# Patient Record
Sex: FEMALE | Race: BLACK OR AFRICAN AMERICAN | Employment: UNEMPLOYED | URBAN - METROPOLITAN AREA
[De-identification: names, ages, dates, MRNs, and addresses within clinical notes are randomized per-mention and may not be internally consistent; named-entity substitution may affect disease eponyms.]

---

## 2017-01-27 ENCOUNTER — OFFICE VISIT (OUTPATIENT)
Dept: CARDIOLOGY CLINIC | Age: 62
End: 2017-01-27

## 2017-01-27 VITALS
HEART RATE: 88 BPM | SYSTOLIC BLOOD PRESSURE: 100 MMHG | OXYGEN SATURATION: 93 % | RESPIRATION RATE: 20 BRPM | BODY MASS INDEX: 35.07 KG/M2 | WEIGHT: 205.4 LBS | HEIGHT: 64 IN | DIASTOLIC BLOOD PRESSURE: 62 MMHG

## 2017-01-27 DIAGNOSIS — I10 ESSENTIAL HYPERTENSION: ICD-10-CM

## 2017-01-27 DIAGNOSIS — E11.22 TYPE 2 DIABETES MELLITUS WITH DIABETIC CHRONIC KIDNEY DISEASE, UNSPECIFIED CKD STAGE, UNSPECIFIED LONG TERM INSULIN USE STATUS: ICD-10-CM

## 2017-01-27 DIAGNOSIS — I42.9 CARDIOMYOPATHY (HCC): Primary | ICD-10-CM

## 2017-01-27 DIAGNOSIS — E66.9 NON MORBID OBESITY, UNSPECIFIED OBESITY TYPE: ICD-10-CM

## 2017-01-27 DIAGNOSIS — Z79.4 ENCOUNTER FOR LONG-TERM (CURRENT) USE OF INSULIN (HCC): ICD-10-CM

## 2017-01-27 RX ORDER — SERTRALINE HYDROCHLORIDE 100 MG/1
200 TABLET, FILM COATED ORAL DAILY
COMMUNITY

## 2017-01-27 RX ORDER — ACETAMINOPHEN AND CODEINE PHOSPHATE 300; 30 MG/1; MG/1
2 TABLET ORAL
COMMUNITY
End: 2017-02-03 | Stop reason: ALTCHOICE

## 2017-01-27 RX ORDER — ASPIRIN 81 MG/1
81 TABLET ORAL DAILY
COMMUNITY

## 2017-01-27 NOTE — PROGRESS NOTES
Visit Vitals    /62 (BP 1 Location: Left arm, BP Patient Position: Sitting)    Pulse 88    Resp 20    Ht 5' 4\" (1.626 m)    Wt 205 lb 6.4 oz (93.2 kg)    SpO2 93%    BMI 35.26 kg/m2

## 2017-01-27 NOTE — PROGRESS NOTES
HISTORY OF PRESENTING ILLNESS      Aiden Casas is a 64 y.o. female with diabetes, hypertension, obesity, anxiety, asthma, CAD, GERD, depression, vertigo and cardiomyopathy referred for consideration of ICD primary prevention of sudden cardiac death. .     . The patient denies chest pain/ shortness of breath, orthopnea, PND, LE edema, palpitations, syncope, presyncope or fatigue.         ACTIVE PROBLEM LIST     Patient Active Problem List    Diagnosis Date Noted    Cardiomyopathy [facility]) 01/27/2017    Non morbid obesity 11/04/2016    Encounter for long-term (current) use of insulin ([facility] 06/14/2016    BMI 35.0-35.9,adult 01/04/2016    Essential hypertension 01/04/2016    Type 2 diabetes mellitus with diabetic chronic kidney disease ([facility] 01/04/2016    Obesity (BMI 30.0-34.9) 08/13/2015    Anxiety state, unspecified 07/02/2014    Invalid Neuropsych Profile With Very Strong Evidence Of Poor Test Taking Effort/Symptom Exaggeration/Malingering 04/24/2014    Memory loss 04/23/2014    Headache(784.0) 04/23/2014           PAST MEDICAL HISTORY     Past Medical History   Diagnosis Date    Anxiety     Asthma     Carpal tunnel syndrome on both sides     Chronic mental illness     Coronary artery disease     Depression     Fracture      right ankle    GERD (gastroesophageal reflux disease)     Hypertension     Memory disorder     PUD (peptic ulcer disease)     Type II or unspecified type diabetes mellitus without mention of complication, uncontrolled     Vertigo            PAST SURGICAL HISTORY     Past Surgical History   Procedure Laterality Date    Hx orthopaedic      Hx free skin graft      Hx coronary stent placement  2014    Hx ankle fracture tx            ALLERGIES     Allergies   Allergen Reactions    Beef Derived (Bovine) Hives    Shellfish Derived Hives          FAMILY HISTORY     Family History   Problem Relation Age of Onset    Cancer Mother     negative for cardiac disease SOCIAL HISTORY     Social History     Social History    Marital status:      Spouse name: N/A    Number of children: N/A    Years of education: N/A     Social History Main Topics    Smoking status: Never Smoker    Smokeless tobacco: Never Used    Alcohol use No    Drug use: No    Sexual activity: Not Currently     Other Topics Concern    None     Social History Narrative         MEDICATIONS     Current Outpatient Prescriptions   Medication Sig    amoxicillin (AMOXIL) 875 mg tablet     aspirin (ASPIRIN) 325 mg tablet     atorvastatin (LIPITOR) 40 mg tablet     butalbital-acetaminophen-caffeine (FIORICET, ESGIC) -40 mg per tablet     carvedilol (COREG) 6.25 mg tablet     cephALEXin (KEFLEX) 500 mg capsule     dicyclomine (BENTYL) 10 mg capsule     gabapentin (NEURONTIN) 100 mg capsule     MUCINEX DM  mg per tablet     TRUEPLUS LANCETS 33 gauge misc     lisinopril (PRINIVIL, ZESTRIL) 5 mg tablet     loratadine (CLARITIN) 10 mg tablet     sertraline (ZOLOFT) 100 mg tablet     Insulin Syringe-Needle U-100 0.5 mL 31 gauge x 5/16 syrg     FERROUS FUMARATE (IRON PO) Take 27 mg by mouth daily.  insulin NPH (NOVOLIN N) 100 unit/mL injection Inject 55 units in AM and 45 units at bedtime. STOP NOVOLIN 70/30    insulin regular (NOVOLIN R) 100 unit/mL injection Use with sliding scale Max units daily: 75    furosemide (LASIX) 20 mg tablet TAKE ONE TABLET BY MOUTH ONCE DAILY    traMADol (ULTRAM) 50 mg tablet Take 50 mg by mouth two (2) times a day.  butalbital-acetaminophen (PHRENILIN)  mg tablet Take 1 Tab by mouth every six (6) hours as needed.  PRENATAL VIT W-CA,FE,FA,<1 MG, (PRENATAL VITAMIN PO) Take 2 Tabs by mouth daily.  benzonatate (TESSALON) 200 mg capsule Take 200 mg by mouth three (3) times daily as needed for Cough.  sitaGLIPtin (JANUVIA) 50 mg tablet Take 1 Tab by mouth every morning.     guaiFENesin SR (MUCINEX) 600 mg SR tablet Take 1 Tab by mouth two (2) times a day.  fluocinoNIDE (LIDEX) 0.05 % topical cream Apply  to affected area two (2) times a day.  isosorbide mononitrate ER (IMDUR) 30 mg tablet Take  by mouth daily.  albuterol (PROAIR HFA) 90 mcg/actuation inhaler Take  by inhalation.  amLODIPine (NORVASC) 5 mg tablet Take 5 mg by mouth daily.  glucose blood VI test strips (TRUETEST TEST STRIPS) strip Test blood glucose 4 times daily    Lancets misc Test blood glucose 4 times daily    meclizine (ANTIVERT) 25 mg tablet Take 1 tablet by mouth three (3) times daily as needed.  metoprolol (LOPRESSOR) 25 mg tablet Take  by mouth two (2) times a day.  buPROPion XL (WELLBUTRIN XL) 300 mg XL tablet Take 300 mg by mouth every morning.  omeprazole (PRILOSEC) 40 mg capsule Take 40 mg by mouth daily.  pravastatin (PRAVACHOL) 40 mg tablet Take 40 mg by mouth nightly.  LORazepam (ATIVAN) 0.5 mg tablet Take  by mouth.  famotidine (PEPCID) 20 mg tablet Take 20 mg by mouth two (2) times a day.  metoclopramide HCl (REGLAN) 5 mg tablet Take 5 mg by mouth Before breakfast, lunch, and dinner.  losartan (COZAAR) 25 mg tablet Take  by mouth daily.  zolpidem (AMBIEN) 10 mg tablet Take  by mouth nightly as needed for Sleep.  calcium-cholecalciferol, D3, (CALCARB 600 WITH VITAMIN D) tablet Take 2 Tabs by mouth daily.  nitroglycerin (NITROSTAT) 0.4 mg SL tablet by SubLINGual route every five (5) minutes as needed for Chest Pain.  loratadine 10 mg cap Take  by mouth.  aspirin 81 mg chewable tablet Take 81 mg by mouth daily.  multivitamin (ONE A DAY) tablet Take 1 Tab by mouth daily.  topiramate (TOPAMAX) 25 mg tablet 1 po qhs     No current facility-administered medications for this visit. I have reviewed the nurses notes, vitals, problem list, allergy list, medical history, family, social history and medications. REVIEW OF SYMPTOMS      General: Pt denies excessive weight gain or loss.  Pt is able to conduct ADL's  HEENT: Denies blurred vision, headaches, hearing loss, epistaxis and difficulty swallowing. Respiratory: Denies cough, congestion, shortness of breath, BECKWITH, wheezing or stridor. Cardiovascular: Denies precordial pain, palpitations, edema or PND  Gastrointestinal: Denies poor appetite, indigestion, abdominal pain or blood in stool  Genitourinary: Denies hematuria, dysuria, increased urinary frequency  Musculoskeletal: Denies joint pain or swelling from muscles or joints  Neurologic: Denies tremor, paresthesias, headache, or sensory motor disturbance  Psychiatric: Denies confusion, insomnia, depression  Integumentray: Denies rash, itching or ulcers. Hematologic: Denies easy bruising, bleeding     PHYSICAL EXAMINATION      Vitals:    01/27/17 1047   Resp: 20   Weight: 205 lb 6.4 oz (93.2 kg)   Height: 5' 4\" (1.626 m)     General: Well developed, in no acute distress. HEENT: No jaundice, oral mucosa moist, no oral ulcers  Neck: Supple, no stiffness, no lymphadenopathy, supple  Heart:  Normal S1/S2 negative S3 or S4. Regular, no murmur, gallop or rub, no jugular venous distention  Respiratory: Clear bilaterally x 4, no wheezing or rales  Abdomen:   Soft, non-tender, bowel sounds are active.   Extremities:  No edema, normal cap refill, no cyanosis. Musculoskeletal: No clubbing, no deformities  Neuro: A&Ox3, speech clear, gait stable, cooperative, no focal neurologic deficits  Skin: Skin color is normal. No rashes or lesions.  Non diaphoretic, moist.  Vascular: 2+ pulses symmetric in all extremities       DIAGNOSTIC DATA      EKG:        LABORATORY DATA    No results found for: WBC, HGBPOC, HGB, HGBP, HCTPOC, HCT, PHCT, RBCH, PLT, MCV, HGBEXT, HCTEXT, PLTEXT   Lab Results   Component Value Date/Time    Sodium 141 03/30/2016 10:07 AM    Potassium 4.3 03/30/2016 10:07 AM    Chloride 104 03/30/2016 10:07 AM    CO2 22 03/30/2016 10:07 AM    Glucose 269 03/30/2016 10:07 AM    BUN 25 03/30/2016 10:07 AM    Creatinine 1.18 03/30/2016 10:07 AM    BUN/Creatinine ratio 21 03/30/2016 10:07 AM    GFR est AA 58 03/30/2016 10:07 AM    GFR est non-AA 50 03/30/2016 10:07 AM    Calcium 10.2 03/30/2016 10:07 AM    Bilirubin, total <0.2 03/30/2016 10:07 AM    ALT 11 03/30/2016 10:07 AM    AST 10 03/30/2016 10:07 AM    Alk. phosphatase 100 03/30/2016 10:07 AM    Protein, total 7.3 03/30/2016 10:07 AM    Albumin 4.2 03/30/2016 10:07 AM    A-G Ratio 1.4 03/30/2016 10:07 AM           ASSESSMENT      1. Cardiomyopathy  2. CAD  3. Hypertension  4. Diabetes Mellitus  5. GERD       PLAN          FOLLOW-UP       Thank you,  Katia Saleem MD and Dr. Hudson Christianson for involving me in the care of this extraordinarily pleasant female. Please do not hesitate to contact me for further questions/concerns.          Kevin Law MD  Cardiac Electrophysiology / Cardiology    [facility].  [address] [facility], [facility], [address], [facility].    Ephraim Webb  [phone] / [phone] Fax   [phone] / [phone] Fax

## 2017-01-27 NOTE — PATIENT INSTRUCTIONS
Treatment Plan:  Single Chamber ICD implant at [facility]  February 10th. You are scheduled for an ICD (implantable cardiac defibrillator) implant at [facility].    Please arrive at the patient registration desk located on the 1st floor of the main building at 11:30am.    Do not eat or drink anything after midnight the night before your procedure. You will need a . You may take your normal medications with a sip of water the morning of your procedure. Lab instructions: Please have labs drawn 7-10 days prior to scheduled procedure. Please call Ange Colbert or Avelina Munoz if you have any questions at [phone]. Please call the office if you develop any type of illness prior to your procedure. Please contact our business office at [phone] with any financial concerns. Implantable Cardioverter-Defibrillator Placement: Before Your Procedure  What is an implantable cardioverter-defibrillator? An implantable cardioverter-defibrillator (ICD) is a small, battery-powered device. It fixes life-threatening changes in your heartbeat. If the ICD detects a life-threatening heart rhythm, it tries to get it back to normal. If the dangerous rhythm does not stop, the ICD sends an electric shock to the heart to restore a normal rhythm. The device then goes back to its watchful mode. The doctor puts an ICD in your chest and attaches it to thin wires, called leads. The leads carry the shocks from the ICD to the heart. Before the procedure, you will get medicine to help you relax. The doctor will make an incision (cut) in the skin just below your collarbone. The cut may be on either side of your chest. The doctor will put the ICD leads through the cut. The leads go into a large blood vessel in the upper chest. Then the doctor will guide the leads through the blood vessel into the heart.  The doctor will place the ICD under the skin of your chest. He or she will attach the leads to the ICD. Then the cut will be closed with stitches. The procedure usually takes about an hour. You may stay in the hospital for 1 or 2 days. You can likely return to many of your normal activities after you get an ICD. But to stay safe, you may need to make some changes to your normal routine. You will need to be careful with certain types of electronic equipment. And you'll need to take extra care with medical and dental tests and procedures. You will be given specific instructions after getting your ICD. You may feel anxious or worried about having an ICD. This is common. You might feel better if you use techniques to help you relax. Make a plan for what to do if the ICD shocks you. And think about how the ICD will help you. Talk to your doctor about ways to help ease anxiety. Follow-up care is a key part of your treatment and safety. Be sure to make and go to all appointments, and call your doctor if you are having problems. It's also a good idea to know your test results and keep a list of the medicines you take. What happens before the procedure? Procedures can be stressful. This information will help you understand what you can expect. And it will help you safely prepare for your procedure. Preparing for the procedure  · Understand exactly what procedure is planned, along with the risks, benefits, and other options. · Tell your doctors ALL the medicines, vitamins, supplements, and herbal remedies you take. Some of these can increase the risk of bleeding or interact with anesthesia. · If you take blood thinners, such as warfarin (Coumadin), clopidogrel (Plavix), or aspirin, be sure to talk to your doctor. He or she will tell you if you should stop taking these medicines before your procedure. Make sure that you understand exactly what your doctor wants you to do. · Your doctor will tell you which medicines to take or stop before your procedure.  You may need to stop taking certain medicines a week or more before the procedure. So talk to your doctor as soon as you can. · If you have an advance directive, let your doctor know. It may include a living will and a durable power of  for health care. Bring a copy to the hospital. If you don't have one, you may want to prepare one. It lets your doctor and loved ones know your health care wishes. Doctors advise that everyone prepare these papers before any type of surgery or procedure. What happens on the day of the procedure? · Follow the instructions exactly about when to stop eating and drinking. If you don't, your procedure may be canceled. If your doctor told you to take your medicines on the day of the procedure, take them with only a sip of water. · Take a bath or shower before you come in for your procedure. Do not apply lotions, perfumes, deodorants, or nail polish. · Take off all jewelry and piercings. And take out contact lenses, if you wear them. At the hospital or surgery center  · Bring a picture ID. · You will be kept comfortable and safe by your anesthesia provider. You may get medicine that relaxes you or puts you in a light sleep. The area being worked on will be numb. · The procedure will take at least 1 hour. Going home  · Be sure you have someone to drive you home. Anesthesia and pain medicine make it unsafe for you to drive. · You will be given more specific instructions about recovering from your procedure. They will cover things like diet, wound care, follow-up care, driving, and getting back to your normal routine. When should you call your doctor? · You have questions or concerns. · You don't understand how to prepare for your procedure. · You become ill before the procedure (such as fever, flu, or a cold). · You need to reschedule or have changed your mind about having the procedure. Where can you learn more? Go to http://jazz-wesly.info/.   Enter C474 in the search box to learn more about \"Implantable Cardioverter-Defibrillator Placement: Before Your Procedure. \"  Current as of: January 27, 2016  Content Version: 11.1  © 7282-8374 [employer]. Care instructions adapted under license by [employer] (which disclaims liability or warranty for this information). If you have questions about a medical condition or this instruction, always ask your healthcare professional. [place] [address] any warranty or liability for your use of this information.

## 2017-02-03 ENCOUNTER — OFFICE VISIT (OUTPATIENT)
Dept: ENDOCRINOLOGY | Age: 62
End: 2017-02-03

## 2017-02-03 VITALS
HEART RATE: 82 BPM | SYSTOLIC BLOOD PRESSURE: 128 MMHG | TEMPERATURE: 97.6 F | DIASTOLIC BLOOD PRESSURE: 69 MMHG | HEIGHT: 64 IN | RESPIRATION RATE: 18 BRPM | WEIGHT: 206.7 LBS | BODY MASS INDEX: 35.29 KG/M2

## 2017-02-03 DIAGNOSIS — E11.65 TYPE 2 DIABETES MELLITUS WITH HYPERGLYCEMIA, WITH LONG-TERM CURRENT USE OF INSULIN (HCC): Primary | ICD-10-CM

## 2017-02-03 DIAGNOSIS — Z79.4 TYPE 2 DIABETES MELLITUS WITH HYPERGLYCEMIA, WITH LONG-TERM CURRENT USE OF INSULIN (HCC): Primary | ICD-10-CM

## 2017-02-03 DIAGNOSIS — E78.2 MIXED HYPERLIPIDEMIA: ICD-10-CM

## 2017-02-03 DIAGNOSIS — I10 ESSENTIAL HYPERTENSION: ICD-10-CM

## 2017-02-03 RX ORDER — INSULIN GLARGINE 100 [IU]/ML
INJECTION, SOLUTION SUBCUTANEOUS
Qty: 40 ML | Refills: 5 | Status: SHIPPED | OUTPATIENT
Start: 2017-02-03 | End: 2017-02-06 | Stop reason: ALTCHOICE

## 2017-02-03 NOTE — PROGRESS NOTES
[facility]               Susan Mchugh MD        [address] 78 [phone] Fax [phone]  [id number]        [address] [address] OU:[phone] Fax [phone] ( Monday)          Patient Information  Date:2/4/2017  Name : Michelle Hernandez 64 y.o.     YOB: 1955         Referred by: self referred        Chief Complaint   Patient presents with    Diabetes     3 mo f/u       History of Present Illness: Michelle Hernandez is a 64 y.o. female here for follow-up of  Type 2 Diabetes Mellitus. She has a longstanding history of type 2 diabetes mellitus. She was on Humulin 70/30 25 units before breakfast and 25 units at bedtime which was changed to NPH and regular insulin  Analogs were expensive  Metformin was discontinued due to CKD   Fasting 70 - 120     She is on NPH     Occasionally has hypoglycemia  No bedtime snack       Here with an aid  Diet not healthy - checking BG     Checking glucose at home    Compliant with medications    No acute issues        SMBG 3 - 4 /day    She has  history of several myocardial infarctions, congestive heart failure. Her cardiologist is Dr. Michi Villa. She has some memory issues, evaluated by psychologist as well as neurology.           Wt Readings from Last 3 Encounters:   02/03/17 206 lb 11.2 oz (93.8 kg)   01/27/17 205 lb 6.4 oz (93.2 kg)   11/03/16 215 lb 11.2 oz (97.8 kg)       BP Readings from Last 3 Encounters:   02/03/17 128/69   01/27/17 100/62   11/03/16 106/58           Past Medical History   Diagnosis Date    Anxiety     Asthma     Carpal tunnel syndrome on both sides     Chronic mental illness     Coronary artery disease     Depression     Fracture      right ankle    GERD (gastroesophageal reflux disease)     Hypertension     Memory disorder     PUD (peptic ulcer disease)     Type II or unspecified type diabetes mellitus without mention of complication, uncontrolled     Vertigo      Current Outpatient Prescriptions   Medication Sig    sertraline (ZOLOFT) 100 mg tablet Take 150 mg by mouth daily.  aspirin delayed-release 81 mg tablet Take  by mouth daily.  carvedilol (COREG) 6.25 mg tablet Take 6.25 mg by mouth two (2) times daily (with meals).  gabapentin (NEURONTIN) 100 mg capsule 100 mg two (2) times a day.  TRUEPLUS LANCETS 33 gauge misc     Insulin Syringe-Needle U-100 0.5 mL 31 gauge x 5/16 syrg     FERROUS FUMARATE (IRON PO) Take 27 mg by mouth daily.  insulin regular (NOVOLIN R) 100 unit/mL injection Use with sliding scale Max units daily: 75    furosemide (LASIX) 20 mg tablet TAKE ONE TABLET BY MOUTH ONCE DAILY    PRENATAL VIT W-CA,FE,FA,<1 MG, (PRENATAL VITAMIN PO) Take 2 Tabs by mouth daily.  sitaGLIPtin (JANUVIA) 50 mg tablet Take 1 Tab by mouth every morning.  guaiFENesin SR (MUCINEX) 600 mg SR tablet Take 1 Tab by mouth two (2) times a day. (Patient taking differently: Take 600 mg by mouth two (2) times daily as needed.)    isosorbide mononitrate ER (IMDUR) 30 mg tablet Take  by mouth daily.  albuterol (PROAIR HFA) 90 mcg/actuation inhaler Take  by inhalation.  amLODIPine (NORVASC) 5 mg tablet Take 5 mg by mouth daily.  glucose blood VI test strips (TRUETEST TEST STRIPS) strip Test blood glucose 4 times daily    Lancets misc Test blood glucose 4 times daily    meclizine (ANTIVERT) 25 mg tablet Take 1 tablet by mouth three (3) times daily as needed.  buPROPion XL (WELLBUTRIN XL) 300 mg XL tablet Take 300 mg by mouth every morning.  omeprazole (PRILOSEC) 40 mg capsule Take 40 mg by mouth daily.  pravastatin (PRAVACHOL) 40 mg tablet Take 40 mg by mouth nightly.  famotidine (PEPCID) 20 mg tablet Take 20 mg by mouth two (2) times a day.  zolpidem (AMBIEN) 10 mg tablet Take  by mouth nightly as needed for Sleep.  calcium-cholecalciferol, D3, (CALCARB 600 WITH VITAMIN D) tablet Take 2 Tabs by mouth daily.     nitroglycerin (NITROSTAT) 0.4 mg SL tablet by SubLINGual route every five (5) minutes as needed for Chest Pain.  loratadine 10 mg cap Take  by mouth.  insulin glargine (LANTUS) 100 unit/mL injection Inject 60  units in AM and 45 units at bedtime Stop NPH    lisinopril (PRINIVIL, ZESTRIL) 5 mg tablet     metoprolol (LOPRESSOR) 25 mg tablet Take  by mouth two (2) times a day.  LORazepam (ATIVAN) 0.5 mg tablet Take  by mouth.  metoclopramide HCl (REGLAN) 5 mg tablet Take 5 mg by mouth Before breakfast, lunch, and dinner.  losartan (COZAAR) 25 mg tablet Take  by mouth daily.  topiramate (TOPAMAX) 25 mg tablet 1 po qhs     No current facility-administered medications for this visit. Allergies   Allergen Reactions    Beef Derived (Bovine) Hives    Shellfish Derived Hives         Review of Systems:  -   - Eyes: no blurry vision no double vision  - Cardiovascular: no chest pain ,no palpitations  - Respiratory: no cough no shortness of breath  - Gastrointestinal: no dysphagia no  abdominal pain  -   -     Physical Examination:   Blood pressure 128/69, pulse 82, temperature 97.6 °F (36.4 °C), temperature source Oral, resp. rate 18, height 5' 4\" (1.626 m), weight 206 lb 11.2 oz (93.8 kg). Estimated body mass index is 35.48 kg/(m^2) as calculated from the following:    Height as of this encounter: 5' 4\" (1.626 m). -   Weight as of this encounter: 206 lb 11.2 oz (93.8 kg). - General: pleasant, no distress, good eye contact  - HEENT: no pallor, no periorbital edema, EOMI  - Neck: supple, no thyromegaly  - Cardiovascular: regular, normal rate, normal S1 and S2,  - Respiratory: clear to auscultation bilaterally  - Gastrointestinal: soft, nontender, nondistended,  BS +  - Musculoskeletal: no edema,no ulcers  - Psychiatric: normal mood and affect  - Skin: color, texture, turgor normal.       Data Reviewed:     [] Glucose records reviewed. [] See glucose records for details (to be scanned).   [] A1C  [] Reviewed labs        Assessment/Plan:     1. Type 2 diabetes mellitus with hyperglycemia, with long-term current use of insulin ([facility])        1. 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 HTN : Continue current therapy     3. Hyperlipidemia : Continue statin. 4. CKD  -    5 . Hypercalcemia, mild, PTH nonsuppressed - resolved      6 . Osteoporosis - managed by PCP   Ankle fracture  GERD,       7 CAD/CHF - Dr Alexandro Garcia, EF 20%    Patient Instructions   Check blood sugars before meals and at bedtime. If the bedtime sugars are less than 100 ,eat a 15 gm snack. Weight and diet control. Januvia 50 mg in AM     The night before surgery donot take Lantus      Lantus or Novolin N cloudy insulin ( slow insulin ) 60  units in AM and 45 units at bedtime    Novolin R clear insulin ( fast acting )  as needed for high sugars before meals   And take 5 units for snacks     Blood sugar  Breakfast/Lunch/Dinner       150-200  Add 5  Units       201-250  Add 10 Units       251-300  Add  15 Units       301-350  Add 20 Units        351-400  Add 25  Units         Follow-up Disposition:  Return in about 3 months (around 5/3/2017). Thank you for allowing me to participate in the care of this patient.     Elisa Rizo MD

## 2017-02-03 NOTE — PROGRESS NOTES
Colette Fleming is a 64 y.o. female here for   Chief Complaint   Patient presents with    Diabetes     3 mo f/u       Functional glucose monitor and record keeping system? - yes  Eye exam within last year? - yes   Foot exam within last year? - yes Jan 2017    Lab Results   Component Value Date/Time    Hemoglobin A1c 8.9 03/30/2016 10:07 AM    Hemoglobin A1c (POC) 7.8 11/03/2016 10:15 AM    Hemoglobin A1c, External 8.3 11/16/2016       Wt Readings from Last 3 Encounters:   01/27/17 205 lb 6.4 oz (93.2 kg)   11/03/16 215 lb 11.2 oz (97.8 kg)   06/14/16 202 lb 14.4 oz (92 kg)     Temp Readings from Last 3 Encounters:   11/03/16 100 °F (37.8 °C) (Oral)   06/14/16 98.4 °F (36.9 °C) (Oral)   01/04/16 96.8 °F (36 °C) (Oral)     BP Readings from Last 3 Encounters:   01/27/17 100/62   11/03/16 106/58   06/14/16 133/69     Pulse Readings from Last 3 Encounters:   01/27/17 88   11/03/16 (!) 103   06/14/16 65

## 2017-02-03 NOTE — MR AVS SNAPSHOT
Visit Information Date & Time Provider Department Dept. Phone Encounter #  
 2/3/2017 10:45 AM Janeth Simmons MD [facility] [phone] [id number] Follow-up Instructions Return in about 3 months (around 5/3/2017). Upcoming Health Maintenance Date Due Hepatitis C Screening 1955 FOOT EXAM Q1 5/22/1965 EYE EXAM RETINAL OR DILATED Q1 5/22/1965 Pneumococcal 19-64 Highest Risk (1 of 3 - PCV13) 5/22/1974 DTaP/Tdap/Td series (1 - Tdap) 5/22/1976 PAP AKA CERVICAL CYTOLOGY 5/22/1976 BREAST CANCER SCRN MAMMOGRAM 5/22/2005 FOBT Q 1 YEAR AGE 50-75 5/22/2005 ZOSTER VACCINE AGE 60> 5/22/2015 INFLUENZA AGE 9 TO ADULT 8/1/2016 HEMOGLOBIN A1C Q6M 5/16/2017 MICROALBUMIN Q1 11/16/2017 LIPID PANEL Q1 11/16/2017 Allergies as of 2/3/2017  Review Complete On: 2/3/2017 By: Janeth Simmons MD  
  
 Severity Noted Reaction Type Reactions Beef Derived (Bovine)  04/23/2014    Hives Shellfish Derived  04/23/2014    Hives Current Immunizations  Never Reviewed No immunizations on file. Not reviewed this visit You Were Diagnosed With   
  
 Codes Comments Type 2 diabetes mellitus with hyperglycemia, with long-term current use of insulin (HCC)    -  Primary ICD-10-CM: E11.65, Z79.4 ICD-9-CM: 250.00, 790.29, V58.67 Vitals BP Pulse Temp Resp Height(growth percentile) Weight(growth percentile) 128/69 (BP 1 Location: Right arm, BP Patient Position: Sitting) 82 97.6 °F (36.4 °C) (Oral) 18 5' 4\" (1.626 m) 206 lb 11.2 oz (93.8 kg) BMI OB Status Smoking Status 35.48 kg/m2 Menopause Never Smoker BMI and BSA Data Body Mass Index Body Surface Area  
 35.48 kg/m 2 2.06 m 2 Preferred Pharmacy Pharmacy Name Phone [facility] [address] [facility], [address] Your Updated Medication List  
  
   
This list is accurate as of: 2/3/17 10:58 AM. Always use your most recent med list.  
  
  
  
  
 AMBIEN 10 mg tablet Generic drug:  zolpidem Take  by mouth nightly as needed for Sleep. amLODIPine 5 mg tablet Commonly known as:  Augustina Kunz Take 5 mg by mouth daily. aspirin delayed-release 81 mg tablet Take  by mouth daily. buPROPion  mg XL tablet Commonly known as:  Vivek Cooley Take 300 mg by mouth every morning. CALCARB 600 WITH VITAMIN D tablet Generic drug:  calcium-cholecalciferol (D3) Take 2 Tabs by mouth daily. carvedilol 6.25 mg tablet Commonly known as:  Cori Nabil Take 6.25 mg by mouth two (2) times daily (with meals). furosemide 20 mg tablet Commonly known as:  LASIX TAKE ONE TABLET BY MOUTH ONCE DAILY  
  
 gabapentin 100 mg capsule Commonly known as:  NEURONTIN  
100 mg two (2) times a day. glucose blood VI test strips strip Commonly known as:  TRUETEST TEST STRIPS Test blood glucose 4 times daily  
  
 guaiFENesin  mg SR tablet Commonly known as:  [place] [address] Take 1 Tab by mouth two (2) times a day. insulin  unit/mL injection Commonly known as:  NovoLIN N Inject 55 units in AM and 45 units at bedtime. STOP NOVOLIN 70/30  
  
 insulin regular 100 unit/mL injection Commonly known as:  NovoLIN R Use with sliding scale Max units daily: 75 Insulin Syringe-Needle U-100 0.5 mL 31 gauge x 5/16 Syrg IRON PO Take 27 mg by mouth daily. isosorbide mononitrate ER 30 mg tablet Commonly known as:  IMDUR Take  by mouth daily. * Lancets Misc Test blood glucose 4 times daily * TRUEPLUS LANCETS 33 gauge Misc Generic drug:  lancets  
  
 lisinopril 5 mg tablet Commonly known as:  PRINIVIL, ZESTRIL  
  
 loratadine 10 mg Cap Take  by mouth. LORazepam 0.5 mg tablet Commonly known as:  ATIVAN Take  by mouth.  
  
 losartan 25 mg tablet Commonly known as:  COZAAR Take  by mouth daily.   
  
 meclizine 25 mg tablet Commonly known as:  ANTIVERT Take 1 tablet by mouth three (3) times daily as needed. metoclopramide HCl 5 mg tablet Commonly known as:  REGLAN Take 5 mg by mouth Before breakfast, lunch, and dinner. metoprolol tartrate 25 mg tablet Commonly known as:  LOPRESSOR Take  by mouth two (2) times a day. NITROSTAT 0.4 mg SL tablet Generic drug:  nitroglycerin  
by SubLINGual route every five (5) minutes as needed for Chest Pain. omeprazole 40 mg capsule Commonly known as:  PRILOSEC Take 40 mg by mouth daily. PEPCID 20 mg tablet Generic drug:  famotidine Take 20 mg by mouth two (2) times a day. PRAVACHOL 40 mg tablet Generic drug:  pravastatin Take 40 mg by mouth nightly. PRENATAL VITAMIN PO Take 2 Tabs by mouth daily. PROAIR HFA 90 mcg/actuation inhaler Generic drug:  albuterol Take  by inhalation. sertraline 100 mg tablet Commonly known as:  ZOLOFT Take 150 mg by mouth daily. SITagliptin 50 mg tablet Commonly known as:  [place] Take 1 Tab by mouth every morning. topiramate 25 mg tablet Commonly known as:  TOPAMAX 1 po qhs  
  
 * Notice: This list has 2 medication(s) that are the same as other medications prescribed for you. Read the directions carefully, and ask your doctor or other care provider to review them with you. Follow-up Instructions Return in about 3 months (around 5/3/2017). Patient Instructions Check blood sugars before meals and at bedtime. If the bedtime sugars are less than 100 ,eat a 15 gm snack. Weight and diet control. Januvia 50 mg in AM  
 
The night before surgery donot take Lantus Lantus or Novolin N cloudy insulin ( slow insulin ) 60  units in AM and 45 units at bedtime Novolin R clear insulin ( fast acting )  as needed for high sugars before meals   And take 5 units for snacks Blood sugar  Breakfast/Lunch/Dinner 150-200  Add 5  Units 201-250  Add 10 Units 251-300  Add  15 Units 301-350  Add 20 Units 351-400  Add 25  Units Introducing [facility]! [facility] introduces Q Chip patient portal. Now you can access parts of your medical record, email your doctor's office, and request medication refills online. 1. In your internet browser, go to [email or web address] 2. Click on the First Time User? Click Here link in the Sign In box. You will see the New Member Sign Up page. 3. Enter your Q Chip Access Code exactly as it appears below. You will not need to use this code after youve completed the sign-up process. If you do not sign up before the expiration date, you must request a new code. · Q Chip Access Code: [email or web address] Expires: 5/4/2017 10:58 AM 
 
4. Enter the last four digits of your Social Security Number (xxxx) and Date of Birth (mm/dd/yyyy) as indicated and click Submit. You will be taken to the next sign-up page. 5. Create a Q Chip ID. This will be your Q Chip login ID and cannot be changed, so think of one that is secure and easy to remember. 6. Create a Q Chip password. You can change your password at any time. 7. Enter your Password Reset Question and Answer. This can be used at a later time if you forget your password. 8. Enter your e-mail address. You will receive e-mail notification when new information is available in [address] 9. Click Sign Up. You can now view and download portions of your medical record. 10. Click the Download Summary menu link to download a portable copy of your medical information. If you have questions, please visit the Frequently Asked Questions section of the Q Chip website. Remember, Q Chip is NOT to be used for urgent needs. For medical emergencies, dial 911. Now available from your iPhone and Android! Please provide this summary of care documentation to your next provider.  
  
  
 Your primary care clinician is listed as Jostin Cazares. If you have any questions after today's visit, please call [phone].

## 2017-02-03 NOTE — PATIENT INSTRUCTIONS
Check blood sugars before meals and at bedtime. If the bedtime sugars are less than 100 ,eat a 15 gm snack. Weight and diet control.      Januvia 50 mg in AM     The night before surgery donot take Lantus      Lantus or Novolin N cloudy insulin ( slow insulin ) 60  units in AM and 45 units at bedtime    Novolin R clear insulin ( fast acting )  as needed for high sugars before meals   And take 5 units for snacks     Blood sugar  Breakfast/Lunch/Dinner       150-200  Add 5  Units       201-250  Add 10 Units       251-300  Add  15 Units       301-350  Add 20 Units        351-400  Add 25  Units

## 2017-02-04 PROBLEM — E78.2 MIXED HYPERLIPIDEMIA: Status: ACTIVE | Noted: 2017-02-04

## 2017-02-06 ENCOUNTER — TELEPHONE (OUTPATIENT)
Dept: CARDIOLOGY CLINIC | Age: 62
End: 2017-02-06

## 2017-02-06 ENCOUNTER — DOCUMENTATION ONLY (OUTPATIENT)
Dept: CARDIOLOGY CLINIC | Age: 62
End: 2017-02-06

## 2017-02-06 DIAGNOSIS — E11.65 TYPE 2 DIABETES MELLITUS WITH HYPERGLYCEMIA, WITH LONG-TERM CURRENT USE OF INSULIN (HCC): Primary | ICD-10-CM

## 2017-02-06 DIAGNOSIS — Z79.4 TYPE 2 DIABETES MELLITUS WITH HYPERGLYCEMIA, WITH LONG-TERM CURRENT USE OF INSULIN (HCC): Primary | ICD-10-CM

## 2017-02-06 NOTE — TELEPHONE ENCOUNTER
Pt returned your call and stated she is going to her primary care Dr Sandra Kern tomorrow, 2/7/17, to get labs drawn. Please call pt at [phone] with any further questions. Thanks.

## 2017-02-06 NOTE — PROGRESS NOTES
Authorization number: [id number] for Single Chamber ICD Implant at [facility] on Feb 10th

## 2017-02-06 NOTE — TELEPHONE ENCOUNTER
Called Ms. Sonia Gardner to inquire if procedural labs had been drawn. Not in [facility] care. Left message on voice mail to return call to office. ICD implant scheduled for 2/10 @ [facility].

## 2017-02-08 DIAGNOSIS — Z79.4 TYPE 2 DIABETES MELLITUS WITH HYPERGLYCEMIA, WITH LONG-TERM CURRENT USE OF INSULIN (HCC): Primary | ICD-10-CM

## 2017-02-08 DIAGNOSIS — E11.65 TYPE 2 DIABETES MELLITUS WITH HYPERGLYCEMIA, WITH LONG-TERM CURRENT USE OF INSULIN (HCC): Primary | ICD-10-CM

## 2017-02-08 RX ORDER — INSULIN GLARGINE 100 [IU]/ML
INJECTION, SOLUTION SUBCUTANEOUS
Qty: 40 ML | Refills: 5 | Status: SHIPPED | OUTPATIENT
Start: 2017-02-08 | End: 2017-03-28 | Stop reason: ALTCHOICE

## 2017-02-10 ENCOUNTER — OP HISTORICAL/CONVERTED ENCOUNTER (OUTPATIENT)
Dept: OTHER | Age: 62
End: 2017-02-10

## 2017-02-10 RX ORDER — CEPHALEXIN 500 MG/1
500 CAPSULE ORAL 3 TIMES DAILY
Qty: 15 CAP | Refills: 0 | Status: SHIPPED | OUTPATIENT
Start: 2017-02-10 | End: 2017-02-15

## 2017-02-10 NOTE — TELEPHONE ENCOUNTER
Requested Prescriptions     Signed Prescriptions Disp Refills    cephALEXin (KEFLEX) 500 mg capsule 15 Cap 0     Sig: Take 1 Cap by mouth three (3) times daily for 5 days. Authorizing Provider: Enrike Mercado     Ordering User: Andrey Cazares     Prescription sent to [facility] on file per verbal order of Dr. Opal Canavan.

## 2017-02-13 DIAGNOSIS — E11.65 TYPE 2 DIABETES MELLITUS WITH HYPERGLYCEMIA, WITH LONG-TERM CURRENT USE OF INSULIN (HCC): ICD-10-CM

## 2017-02-13 DIAGNOSIS — Z79.4 TYPE 2 DIABETES MELLITUS WITH HYPERGLYCEMIA, WITH LONG-TERM CURRENT USE OF INSULIN (HCC): ICD-10-CM

## 2017-02-22 ENCOUNTER — CLINICAL SUPPORT (OUTPATIENT)
Dept: CARDIOLOGY CLINIC | Age: 62
End: 2017-02-22

## 2017-02-22 ENCOUNTER — OFFICE VISIT (OUTPATIENT)
Dept: CARDIOLOGY CLINIC | Age: 62
End: 2017-02-22

## 2017-02-22 VITALS
OXYGEN SATURATION: 98 % | HEIGHT: 64 IN | HEART RATE: 85 BPM | SYSTOLIC BLOOD PRESSURE: 138 MMHG | RESPIRATION RATE: 20 BRPM | DIASTOLIC BLOOD PRESSURE: 70 MMHG

## 2017-02-22 DIAGNOSIS — I42.9 CARDIOMYOPATHY (HCC): Primary | ICD-10-CM

## 2017-02-22 DIAGNOSIS — Z95.810 PRESENCE OF AUTOMATIC CARDIOVERTER/DEFIBRILLATOR (AICD): Primary | ICD-10-CM

## 2017-02-22 NOTE — PROGRESS NOTES
HISTORY OF PRESENTING ILLNESS      Smitha Goodson is a 64 y.o. female with recent ICD placement presenting today for 2 week device check. Incision site is negative for erythema, swelling or drainage. The patient denies fever, chest pain/ shortness of breath, orthopnea, PND, LE edema, palpitations, syncope, presyncope or fatigue.           ACTIVE PROBLEM LIST     Patient Active Problem List    Diagnosis Date Noted    Mixed hyperlipidemia 02/04/2017    Cardiomyopathy ([facility] [address] 01/27/2017    Non morbid obesity 11/04/2016    Encounter for long-term (current) use of insulin ([facility] 06/14/2016    BMI 35.0-35.9,adult 01/04/2016    Essential hypertension 01/04/2016    Type 2 diabetes mellitus with diabetic chronic kidney disease ([facility] 01/04/2016    Obesity (BMI 30.0-34.9) 08/13/2015    Anxiety state, unspecified 07/02/2014    Invalid Neuropsych Profile With Very Strong Evidence Of Poor Test Taking Effort/Symptom Exaggeration/Malingering 04/24/2014    Memory loss 04/23/2014    Headache(784.0) 04/23/2014           PAST MEDICAL HISTORY     Past Medical History:   Diagnosis Date    Anxiety     Asthma     Carpal tunnel syndrome on both sides     Chronic mental illness     Coronary artery disease     Depression     Fracture     right ankle    GERD (gastroesophageal reflux disease)     Hypertension     Memory disorder     PUD (peptic ulcer disease)     Type II or unspecified type diabetes mellitus without mention of complication, uncontrolled     Vertigo            PAST SURGICAL HISTORY     Past Surgical History:   Procedure Laterality Date    HX ANKLE FRACTURE TX      HX CORONARY STENT PLACEMENT  2014    HX FREE SKIN GRAFT      HX ORTHOPAEDIC            ALLERGIES     Allergies   Allergen Reactions    Beef Derived (Bovine) Hives    Shellfish Derived Hives          FAMILY HISTORY     Family History   Problem Relation Age of Onset    Cancer Mother     negative for cardiac disease       SOCIAL HISTORY Social History     Social History    Marital status:      Spouse name: N/A    Number of children: N/A    Years of education: N/A     Social History Main Topics    Smoking status: Never Smoker    Smokeless tobacco: Never Used    Alcohol use No    Drug use: No    Sexual activity: Not Currently     Other Topics Concern    None     Social History Narrative         MEDICATIONS     Current Outpatient Prescriptions   Medication Sig    ZOLPIDEM TARTRATE (ZOLPIDEM PO) Take  by mouth.  insulin glargine (LANTUS) 100 unit/mL injection Inject 60 units in the AM and 45 units at bedtime Stop NPH and Levemir    insulin detemir (LEVEMIR) 100 unit/mL injection Inject 60 units in the AM and 45 units at bedtime Stop NPH and Lantus    sertraline (ZOLOFT) 100 mg tablet Take 150 mg by mouth daily.  aspirin delayed-release 81 mg tablet Take  by mouth daily.  carvedilol (COREG) 6.25 mg tablet Take 6.25 mg by mouth two (2) times daily (with meals).  gabapentin (NEURONTIN) 100 mg capsule 100 mg two (2) times a day.  TRUEPLUS LANCETS 33 gauge misc     Insulin Syringe-Needle U-100 0.5 mL 31 gauge x 5/16 syrg     FERROUS FUMARATE (IRON PO) Take 27 mg by mouth daily.  insulin regular (NOVOLIN R) 100 unit/mL injection Use with sliding scale Max units daily: 75    furosemide (LASIX) 20 mg tablet TAKE ONE TABLET BY MOUTH ONCE DAILY    PRENATAL VIT W-CA,FE,FA,<1 MG, (PRENATAL VITAMIN PO) Take 2 Tabs by mouth daily.  sitaGLIPtin (JANUVIA) 50 mg tablet Take 1 Tab by mouth every morning.  guaiFENesin SR (MUCINEX) 600 mg SR tablet Take 1 Tab by mouth two (2) times a day. (Patient taking differently: Take 600 mg by mouth two (2) times daily as needed.)    isosorbide mononitrate ER (IMDUR) 30 mg tablet Take  by mouth daily.  albuterol (PROAIR HFA) 90 mcg/actuation inhaler Take  by inhalation.  amLODIPine (NORVASC) 5 mg tablet Take 5 mg by mouth daily.     glucose blood VI test strips (TRUETEST TEST STRIPS) strip Test blood glucose 4 times daily    Lancets misc Test blood glucose 4 times daily    meclizine (ANTIVERT) 25 mg tablet Take 1 tablet by mouth three (3) times daily as needed.  metoprolol (LOPRESSOR) 25 mg tablet Take  by mouth two (2) times a day.  buPROPion XL (WELLBUTRIN XL) 300 mg XL tablet Take 300 mg by mouth every morning.  omeprazole (PRILOSEC) 40 mg capsule Take 40 mg by mouth daily.  pravastatin (PRAVACHOL) 40 mg tablet Take 40 mg by mouth nightly.  LORazepam (ATIVAN) 0.5 mg tablet Take  by mouth.  famotidine (PEPCID) 20 mg tablet Take 20 mg by mouth two (2) times a day.  metoclopramide HCl (REGLAN) 5 mg tablet Take 5 mg by mouth Before breakfast, lunch, and dinner.  losartan (COZAAR) 25 mg tablet Take  by mouth daily.  zolpidem (AMBIEN) 10 mg tablet Take  by mouth nightly as needed for Sleep.  calcium-cholecalciferol, D3, (CALCARB 600 WITH VITAMIN D) tablet Take 2 Tabs by mouth daily.  nitroglycerin (NITROSTAT) 0.4 mg SL tablet by SubLINGual route every five (5) minutes as needed for Chest Pain.  loratadine 10 mg cap Take  by mouth.  topiramate (TOPAMAX) 25 mg tablet 1 po qhs    lisinopril (PRINIVIL, ZESTRIL) 5 mg tablet      No current facility-administered medications for this visit. I have reviewed the nurses notes, vitals, problem list, allergy list, medical history, family, social history and medications. REVIEW OF SYMPTOMS      General: Pt denies excessive weight gain or loss. Pt is able to conduct ADL's  HEENT: Denies blurred vision, headaches, hearing loss, epistaxis and difficulty swallowing. Respiratory: Denies cough, congestion, shortness of breath, BECKWITH, wheezing or stridor.   Cardiovascular: Denies precordial pain, palpitations, edema or PND  Gastrointestinal: Denies poor appetite, indigestion, abdominal pain or blood in stool  Genitourinary: Denies hematuria, dysuria, increased urinary frequency  Musculoskeletal: Denies joint pain or swelling from muscles or joints  Neurologic: Denies tremor, paresthesias, headache, or sensory motor disturbance  Psychiatric: Denies confusion, insomnia, depression  Integumentray: right sided subclavian incision, steri strips still present. Denies rash, itching or ulcers. Hematologic: Denies easy bruising, bleeding       PHYSICAL EXAMINATION      Vitals:    02/22/17 1249   BP: 138/70   Pulse: 85   Resp: 20   SpO2: 98%   Height: 5' 4\" (1.626 m)     General: Well developed, in no acute distress. HEENT: No jaundice, oral mucosa moist, no oral ulcers  Neck: Supple, no stiffness, no lymphadenopathy, supple  Heart:  Normal S1/S2 negative S3 or S4. Regular, no murmur, gallop or rub, no jugular venous distention  Respiratory: Clear bilaterally x 4, no wheezing or rales  Abdomen:   Soft, non-tender, bowel sounds are active.   Extremities:  No edema, normal cap refill, no cyanosis. Musculoskeletal: No clubbing, no deformities  Neuro: A&Ox3, speech clear, gait stable, cooperative, no focal neurologic deficits  Skin: Skin color is normal. No rashes or lesions. Non diaphoretic, moist.  Vascular: 2+ pulses symmetric in all extremities       DIAGNOSTIC DATA      EKG: sinus rhythm with PACs       LABORATORY DATA    No results found for: WBC, HGBPOC, HGB, HGBP, HCTPOC, HCT, PHCT, RBCH, PLT, MCV, HGBEXT, HCTEXT, PLTEXT   Lab Results   Component Value Date/Time    Sodium 141 03/30/2016 10:07 AM    Potassium 4.3 03/30/2016 10:07 AM    Chloride 104 03/30/2016 10:07 AM    CO2 22 03/30/2016 10:07 AM    Glucose 269 03/30/2016 10:07 AM    BUN 25 03/30/2016 10:07 AM    Creatinine 1.18 03/30/2016 10:07 AM    BUN/Creatinine ratio 21 03/30/2016 10:07 AM    GFR est AA 58 03/30/2016 10:07 AM    GFR est non-AA 50 03/30/2016 10:07 AM    Calcium 10.2 03/30/2016 10:07 AM    Bilirubin, total <0.2 03/30/2016 10:07 AM    AST (SGOT) 10 03/30/2016 10:07 AM    Alk.  phosphatase 100 03/30/2016 10:07 AM    Protein, total 7.3 03/30/2016 10:07 AM Albumin 4.2 03/30/2016 10:07 AM    A-G Ratio 1.4 03/30/2016 10:07 AM    ALT (SGPT) 11 03/30/2016 10:07 AM           ASSESSMENT      1. Cardiomyopathy   A. Non-ischemic   B. NYHA class 2  2. CAD   A. Native  3. Percutaneous coronary transluminal angioplasty   4. ICD   A. [address] Single chamber   C. Primary prevention         PLAN     Continue mobility restrictions and monitor for s/s of infection. FOLLOW-UP     One month    Thank you, Garland Garcia MD and Dr. Lacy Cotter for allowing me to participate in the care of this extraordinarily pleasant female. Please do not hesitate to contact me for further questions/concerns.      Rebecca Gilmore, ARSALAN Chauhan MD  Cardiac Electrophysiology / Cardiology    [place] [address].  [address], [address] [facility]  [place]  [phone] / [phone] Fax   [phone] / [phone] Fax

## 2017-02-22 NOTE — PROGRESS NOTES
Visit Vitals    /70 (BP 1 Location: Left arm, BP Patient Position: Sitting)    Pulse 85    Resp 20    Ht 5' 4\" (1.626 m)    SpO2 98%

## 2017-02-23 NOTE — PROGRESS NOTES
See scanned ICD report in Chart Review. Checked by rep. Chargeable visit.   Going back to Dr Rena Petit

## 2017-03-13 LAB — HBA1C MFR BLD HPLC: 7.7 %

## 2017-03-22 RX ORDER — FUROSEMIDE 20 MG/1
TABLET ORAL
Qty: 30 TAB | Refills: 0 | Status: SHIPPED | OUTPATIENT
Start: 2017-03-22 | End: 2017-05-03 | Stop reason: SDUPTHER

## 2017-03-23 LAB
HBA1C MFR BLD HPLC: 7.7 %
LDL-C, EXTERNAL: 91
MICROALBUMIN UR TEST STR-MCNC: 9.7 MG/DL

## 2017-03-28 DIAGNOSIS — E11.65 TYPE 2 DIABETES MELLITUS WITH HYPERGLYCEMIA, WITH LONG-TERM CURRENT USE OF INSULIN (HCC): Primary | ICD-10-CM

## 2017-03-28 DIAGNOSIS — Z79.4 TYPE 2 DIABETES MELLITUS WITH HYPERGLYCEMIA, WITH LONG-TERM CURRENT USE OF INSULIN (HCC): Primary | ICD-10-CM

## 2017-03-28 RX ORDER — PEN NEEDLE, DIABETIC 31 GX3/16"
NEEDLE, DISPOSABLE MISCELLANEOUS
Qty: 100 PEN NEEDLE | Refills: 11 | Status: SHIPPED | OUTPATIENT
Start: 2017-03-28 | End: 2019-01-08 | Stop reason: SDUPTHER

## 2017-03-28 RX ORDER — INSULIN GLARGINE 100 [IU]/ML
INJECTION, SOLUTION SUBCUTANEOUS
Qty: 45 ML | Refills: 5 | Status: SHIPPED | OUTPATIENT
Start: 2017-03-28 | End: 2017-08-15 | Stop reason: SDUPTHER

## 2017-03-28 NOTE — TELEPHONE ENCOUNTER
Please call in something else other than the Lantus patient stated insurance will not cover.  Thank you

## 2017-04-06 ENCOUNTER — TELEPHONE (OUTPATIENT)
Dept: CARDIOLOGY CLINIC | Age: 62
End: 2017-04-06

## 2017-04-06 NOTE — TELEPHONE ENCOUNTER
Returned patient call, ID verified using two patient identifiers, patient states she needs a box for remote checks for her Mandi Ibanez 80 (implanted 2/10/17 per Dr. Devon Calix). Advised patient that I would forward her request to LUCILLE Santana in the device clinic. Patient address and phone numbers verified as correct. Patient agreeable to this plan.

## 2017-05-01 ENCOUNTER — TELEPHONE (OUTPATIENT)
Dept: CARDIOLOGY CLINIC | Age: 62
End: 2017-05-01

## 2017-05-01 NOTE — TELEPHONE ENCOUNTER
Spoke to pt regarding her Latitude not working. Very hard to understand pt about what happened when her nurse called tech support. She will be in on Tues from 9-2 so I will call her back then.

## 2017-05-01 NOTE — TELEPHONE ENCOUNTER
The patient can be reached at [phone] regarding transmission issues. The troubleshooting is not working with the implant device.

## 2017-05-02 NOTE — TELEPHONE ENCOUNTER
Spoke to pts nurse again to inform her that it is still showing that the communicator is not working. Informed her to reset Latitude by holding down the button on the back of the unit for about a sec then send another transmission. She will try this either today or tomorrow. I will call her tomorrow before 2 to let her know if it worked.

## 2017-05-02 NOTE — TELEPHONE ENCOUNTER
Talked to pts nurse & she stated that she spoke with tech support this am.  They think it is working now. I am having her send a transmission to pair monitor & device & will call her back in about an hr to see if it worked.

## 2017-05-03 ENCOUNTER — OFFICE VISIT (OUTPATIENT)
Dept: ENDOCRINOLOGY | Age: 62
End: 2017-05-03

## 2017-05-03 VITALS
TEMPERATURE: 97.8 F | SYSTOLIC BLOOD PRESSURE: 134 MMHG | BODY MASS INDEX: 34.93 KG/M2 | WEIGHT: 204.6 LBS | DIASTOLIC BLOOD PRESSURE: 70 MMHG | HEART RATE: 89 BPM | HEIGHT: 64 IN | RESPIRATION RATE: 18 BRPM

## 2017-05-03 DIAGNOSIS — Z79.4 TYPE 2 DIABETES MELLITUS WITH HYPERGLYCEMIA, WITH LONG-TERM CURRENT USE OF INSULIN (HCC): ICD-10-CM

## 2017-05-03 DIAGNOSIS — Z79.4 TYPE 2 DIABETES MELLITUS WITH HYPERGLYCEMIA, WITH LONG-TERM CURRENT USE OF INSULIN (HCC): Primary | ICD-10-CM

## 2017-05-03 DIAGNOSIS — E83.52 HYPERCALCEMIA: ICD-10-CM

## 2017-05-03 DIAGNOSIS — E11.65 TYPE 2 DIABETES MELLITUS WITH HYPERGLYCEMIA, WITH LONG-TERM CURRENT USE OF INSULIN (HCC): ICD-10-CM

## 2017-05-03 DIAGNOSIS — I10 ESSENTIAL HYPERTENSION: ICD-10-CM

## 2017-05-03 DIAGNOSIS — I10 ESSENTIAL HYPERTENSION WITH GOAL BLOOD PRESSURE LESS THAN 140/90: ICD-10-CM

## 2017-05-03 DIAGNOSIS — E78.2 MIXED HYPERLIPIDEMIA: ICD-10-CM

## 2017-05-03 DIAGNOSIS — N17.9 ACUTE RENAL FAILURE, UNSPECIFIED ACUTE RENAL FAILURE TYPE (HCC): ICD-10-CM

## 2017-05-03 DIAGNOSIS — E66.09 NON MORBID OBESITY DUE TO EXCESS CALORIES: ICD-10-CM

## 2017-05-03 DIAGNOSIS — E11.65 TYPE 2 DIABETES MELLITUS WITH HYPERGLYCEMIA, WITH LONG-TERM CURRENT USE OF INSULIN (HCC): Primary | ICD-10-CM

## 2017-05-03 RX ORDER — SPIRONOLACTONE 25 MG/1
TABLET ORAL DAILY
COMMUNITY
End: 2019-05-01

## 2017-05-03 NOTE — PATIENT INSTRUCTIONS
Check blood sugars before meals and at bedtime. If the bedtime sugars are less than 100 ,eat a 15 gm snack. Weight and diet control.      Januvia 50 mg in AM        Lantus or Novolin N cloudy insulin ( slow insulin ) 60  units in AM and 45 units at bedtime    Novolin R clear insulin ( fast acting )  as needed for high sugars before meals   And take 5 units for snacks     Blood sugar  Breakfast/Lunch/Dinner       150-200  Add 5  Units       201-250  Add 10 Units       251-300  Add  15 Units       301-350  Add 20 Units        351-400  Add 25  Units

## 2017-05-03 NOTE — PROGRESS NOTES
Leticia Sanders is a 64 y.o. female here for   Chief Complaint   Patient presents with    Diabetes     Scheduled for cataract surgery June 22nd, left eye    Functional glucose monitor and record keeping system? - yes  Eye exam within last year? - yes   Foot exam within last year? - yes Jan 2017    Lab Results   Component Value Date/Time    Hemoglobin A1c 8.9 03/30/2016 10:07 AM    Hemoglobin A1c (POC) 7.8 11/03/2016 10:15 AM    Hemoglobin A1c, External 8.3 11/16/2016       Wt Readings from Last 3 Encounters:   02/03/17 206 lb 11.2 oz (93.8 kg)   01/27/17 205 lb 6.4 oz (93.2 kg)   11/03/16 215 lb 11.2 oz (97.8 kg)     Temp Readings from Last 3 Encounters:   02/03/17 97.6 °F (36.4 °C) (Oral)   11/03/16 100 °F (37.8 °C) (Oral)   06/14/16 98.4 °F (36.9 °C) (Oral)     BP Readings from Last 3 Encounters:   02/22/17 138/70   02/03/17 128/69   01/27/17 100/62     Pulse Readings from Last 3 Encounters:   02/22/17 85   02/03/17 82   01/27/17 88

## 2017-05-03 NOTE — PROGRESS NOTES
[facility]               Reese Workman MD        [address] 78 [phone] Fax [phone] ( [facility]) [address] [address] [address] PO:[phone] Fax [phone] ( Monday)          Patient Information  Date:5/3/2017  Name : Monie Bills 64 y.o.     YOB: 1955         Referred by: self referred        Chief Complaint   Patient presents with    Diabetes       History of Present Illness: Monie Bills is a 64 y.o. female here for follow-up of  Type 2 Diabetes Mellitus. She has a longstanding history of type 2 diabetes mellitus. She was on Humulin 70/30 25 units before breakfast and 25 units at bedtime which was changed to NPH and regular insulin  Analogs were expensive - now on Basaglar   Metformin was discontinued due to CKD    no hypoglycemia  No bedtime snack       Here with an aid  Checking glucose at home    SMBG 3 - 4 /day    She has  history of several myocardial infarctions, congestive heart failure. Her cardiologist is Dr. Carlie Blancas. She has some memory issues, evaluated by psychologist as well as neurology.           Wt Readings from Last 3 Encounters:   05/03/17 204 lb 9.6 oz (92.8 kg)   02/03/17 206 lb 11.2 oz (93.8 kg)   01/27/17 205 lb 6.4 oz (93.2 kg)       BP Readings from Last 3 Encounters:   05/03/17 134/70   02/22/17 138/70   02/03/17 128/69           Past Medical History:   Diagnosis Date    Anxiety     Asthma     Carpal tunnel syndrome on both sides     Chronic mental illness     Coronary artery disease     Depression     Fracture     right ankle    GERD (gastroesophageal reflux disease)     Hypertension     Memory disorder     PUD (peptic ulcer disease)     Type II or unspecified type diabetes mellitus without mention of complication, uncontrolled     Vertigo      Current Outpatient Prescriptions   Medication Sig    guaiFENesin-dextromethorphan SR (MUCINEX DM) 600-30 mg per tablet Take 1 Tab by mouth two (2) times a day.  spironolactone (ALDACTONE) 25 mg tablet Take  by mouth daily.  furosemide (LASIX) 20 mg tablet TAKE ONE TABLET BY MOUTH ONCE DAILY    insulin glargine (BASAGLAR KWIKPEN) 100 unit/mL (3 mL) pen Inject 60 units in the AM and 45 units at bedtime Stop NPH and Levemir    sertraline (ZOLOFT) 100 mg tablet Take 150 mg by mouth daily.  aspirin delayed-release 81 mg tablet Take  by mouth daily.  carvedilol (COREG) 6.25 mg tablet Take 6.25 mg by mouth two (2) times daily (with meals).  gabapentin (NEURONTIN) 100 mg capsule 100 mg two (2) times a day.  FERROUS FUMARATE (IRON PO) Take 65 mg by mouth daily.  sitaGLIPtin (JANUVIA) 50 mg tablet Take 1 Tab by mouth every morning.  isosorbide mononitrate ER (IMDUR) 30 mg tablet Take  by mouth daily.  albuterol (PROAIR HFA) 90 mcg/actuation inhaler Take  by inhalation.  amLODIPine (NORVASC) 5 mg tablet Take 5 mg by mouth daily.  buPROPion XL (WELLBUTRIN XL) 300 mg XL tablet Take 300 mg by mouth every morning.  pravastatin (PRAVACHOL) 40 mg tablet Take 40 mg by mouth nightly.  famotidine (PEPCID) 20 mg tablet Take 20 mg by mouth two (2) times a day.  zolpidem (AMBIEN) 10 mg tablet Take  by mouth nightly as needed for Sleep.  calcium-cholecalciferol, D3, (CALCARB 600 WITH VITAMIN D) tablet Take 2 Tabs by mouth daily.  nitroglycerin (NITROSTAT) 0.4 mg SL tablet by SubLINGual route every five (5) minutes as needed for Chest Pain.  loratadine 10 mg cap Take  by mouth.  insulin regular (NOVOLIN R) 100 unit/mL injection Use with sliding scale Max units daily: 75    Insulin Needles, Disposable, 32 gauge x 5/32\" ndle Use to inject Basaglar BID Dx Code: E11.65    TRUEPLUS LANCETS 33 gauge misc     PRENATAL VIT W-CA,FE,FA,<1 MG, (PRENATAL VITAMIN PO) Take 2 Tabs by mouth daily.  guaiFENesin SR (MUCINEX) 600 mg SR tablet Take 1 Tab by mouth two (2) times a day.  (Patient taking differently: Take 600 mg by mouth two (2) times daily as needed.)    glucose blood VI test strips (TRUETEST TEST STRIPS) strip Test blood glucose 4 times daily    Lancets misc Test blood glucose 4 times daily    meclizine (ANTIVERT) 25 mg tablet Take 1 tablet by mouth three (3) times daily as needed.  omeprazole (PRILOSEC) 40 mg capsule Take 40 mg by mouth daily.  topiramate (TOPAMAX) 25 mg tablet 1 po qhs     No current facility-administered medications for this visit. Allergies   Allergen Reactions    Beef Derived (Bovine) Hives    Shellfish Derived Hives         Review of Systems:  -   - Eyes: no blurry vision no double vision  - Cardiovascular: no chest pain ,no palpitations  - Respiratory: no cough no shortness of breath  - Gastrointestinal: no dysphagia no  abdominal pain  -   -     Physical Examination:   Blood pressure 134/70, pulse 89, temperature 97.8 °F (36.6 °C), temperature source Oral, resp. rate 18, height 5' 4\" (1.626 m), weight 204 lb 9.6 oz (92.8 kg). Estimated body mass index is 35.12 kg/(m^2) as calculated from the following:    Height as of this encounter: 5' 4\" (1.626 m). -   Weight as of this encounter: 204 lb 9.6 oz (92.8 kg). - General: pleasant, no distress, good eye contact  - HEENT: no pallor, no periorbital edema, EOMI  - Neck: supple, no thyromegaly  - Cardiovascular: regular, normal rate, normal S1 and S2,  - Respiratory: clear to auscultation bilaterally  - Gastrointestinal: soft, nontender, nondistended,  BS +  - Musculoskeletal: no edema,no ulcers  - Psychiatric: normal mood and affect  - Skin: color, texture, turgor normal.       Data Reviewed:     [] Glucose records reviewed. [] See glucose records for details (to be scanned). [] A1C  [] Reviewed labs        Assessment/Plan:     1. Type 2 diabetes mellitus with hyperglycemia, with long-term current use of insulin ([address]    2. Essential hypertension        1.  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 HTN : Continue current therapy     3. Hyperlipidemia : Continue statin. 4. CKD  -    5 . Obesity Body mass index is 35.12 kg/(m^2). 6 .Osteoporosis - managed by PCP   Ankle fracture  GERD,       7 CAD/CHF - Dr Abel Grade, EF 20%    Patient Instructions   Check blood sugars before meals and at bedtime. If the bedtime sugars are less than 100 ,eat a 15 gm snack. Weight and diet control. Januvia 50 mg in AM        Lantus or Novolin N cloudy insulin ( slow insulin ) 60  units in AM and 45 units at bedtime    Novolin R clear insulin ( fast acting )  as needed for high sugars before meals   And take 5 units for snacks     Blood sugar  Breakfast/Lunch/Dinner       150-200  Add 5  Units       201-250  Add 10 Units       251-300  Add  15 Units       301-350  Add 20 Units        351-400  Add 25  Units         Follow-up Disposition:  Return in about 3 months (around 8/3/2017). Thank you for allowing me to participate in the care of this patient.     Annamaria Morse MD

## 2017-05-03 NOTE — MR AVS SNAPSHOT
Visit Information Date & Time Provider Department Dept. Phone Encounter #  
 5/3/2017  9:45 AM Lg Allred MD [facility] Diabetes [facility] Endocr[facility]ology [phone] [id number] Follow-up Instructions Return in about 3 months (around 8/3/2017). Upcoming Health Maintenance Date Due Hepatitis C Screening 1955 FOOT EXAM Q1 5/22/1965 EYE EXAM RETINAL OR DILATED Q1 5/22/1965 Pneumococcal 19-64 Highest Risk (1 of 3 - PCV13) 5/22/1974 DTaP/Tdap/Td series (1 - Tdap) 5/22/1976 PAP AKA CERVICAL CYTOLOGY 5/22/1976 BREAST CANCER SCRN MAMMOGRAM 5/22/2005 FOBT Q 1 YEAR AGE 50-75 5/22/2005 ZOSTER VACCINE AGE 60> 5/22/2015 HEMOGLOBIN A1C Q6M 5/16/2017 INFLUENZA AGE 9 TO ADULT 8/1/2017 MICROALBUMIN Q1 11/16/2017 LIPID PANEL Q1 11/16/2017 Allergies as of 5/3/2017  Review Complete On: 5/3/2017 By: Lg Allred MD  
  
 Severity Noted Reaction Type Reactions Beef Derived (Bovine)  04/23/2014    Hives Shellfish Derived  04/23/2014    Hives Current Immunizations  Never Reviewed No immunizations on file. Not reviewed this visit You Were Diagnosed With   
  
 Codes Comments Type 2 diabetes mellitus with hyperglycemia, with long-term current use of insulin (HCC)    -  Primary ICD-10-CM: E11.65, Z79.4 ICD-9-CM: 250.00, 790.29, V58.67 Essential hypertension     ICD-10-CM: I10 
ICD-9-CM: 401.9 Vitals BP Pulse Temp Resp Height(growth percentile) Weight(growth percentile) 134/70 (BP 1 Location: Right arm, BP Patient Position: Sitting) 89 97.8 °F (36.6 °C) (Oral) 18 5' 4\" (1.626 m) 204 lb 9.6 oz (92.8 kg) BMI OB Status Smoking Status 35.12 kg/m2 Menopause Never Smoker BMI and BSA Data Body Mass Index Body Surface Area  
 35.12 kg/m 2 2.05 m 2 Preferred Pharmacy Pharmacy Name Phone [facility] [address] [facility], [address] Your Updated Medication List  
  
   
This list is accurate as of: 5/3/17  1:34 PM.  Always use your most recent med list.  
  
  
  
  
 AMBIEN 10 mg tablet Generic drug:  zolpidem Take  by mouth nightly as needed for Sleep. amLODIPine 5 mg tablet Commonly known as:  Christine Sesayop Take 5 mg by mouth daily. aspirin delayed-release 81 mg tablet Take  by mouth daily. buPROPion  mg XL tablet Commonly known as:  Kacy Wilkinser Take 300 mg by mouth every morning. CALCARB 600 WITH VITAMIN D tablet Generic drug:  calcium-cholecalciferol (D3) Take 2 Tabs by mouth daily. carvedilol 6.25 mg tablet Commonly known as:  Cherrie Devamericahire Take 6.25 mg by mouth two (2) times daily (with meals). furosemide 20 mg tablet Commonly known as:  LASIX TAKE ONE TABLET BY MOUTH ONCE DAILY  
  
 gabapentin 100 mg capsule Commonly known as:  NEURONTIN  
100 mg two (2) times a day. glucose blood VI test strips strip Commonly known as:  TRUETEST TEST STRIPS Test blood glucose 4 times daily  
  
 guaiFENesin  mg SR tablet Commonly known as:  [address] Take 1 Tab by mouth two (2) times a day. insulin glargine 100 unit/mL (3 mL) pen Commonly known asJed Fanning Inject 60 units in the AM and 45 units at bedtime Stop NPH and Levemir Insulin Needles (Disposable) 32 gauge x 5/32\" Ndle Use to inject Basaglar BID Dx Code: E11.65  
  
 insulin regular 100 unit/mL injection Commonly known as:  NovoLIN R Use with sliding scale Max units daily: 75 IRON PO Take 65 mg by mouth daily. isosorbide mononitrate ER 30 mg tablet Commonly known as:  IMDUR Take  by mouth daily. * Lancets Misc Test blood glucose 4 times daily * TRUEPLUS LANCETS 33 gauge Misc Generic drug:  lancets  
  
 loratadine 10 mg Cap Take  by mouth.  
  
 meclizine 25 mg tablet Commonly known as:  ANTIVERT Take 1 tablet by mouth three (3) times daily as needed.   
  
 [address] DM 600-30 mg per tablet Generic drug:  guaiFENesin-dextromethorphan SR Take 1 Tab by mouth two (2) times a day. NITROSTAT 0.4 mg SL tablet Generic drug:  nitroglycerin  
by SubLINGual route every five (5) minutes as needed for Chest Pain. omeprazole 40 mg capsule Commonly known as:  PRILOSEC Take 40 mg by mouth daily. PEPCID 20 mg tablet Generic drug:  famotidine Take 20 mg by mouth two (2) times a day. PRAVACHOL 40 mg tablet Generic drug:  pravastatin Take 40 mg by mouth nightly. PRENATAL VITAMIN PO Take 2 Tabs by mouth daily. PROAIR HFA 90 mcg/actuation inhaler Generic drug:  albuterol Take  by inhalation. sertraline 100 mg tablet Commonly known as:  ZOLOFT Take 150 mg by mouth daily. SITagliptin 50 mg tablet Commonly known as:  Mansoor Soulier Take 1 Tab by mouth every morning. spironolactone 25 mg tablet Commonly known as:  ALDACTONE Take  by mouth daily. topiramate 25 mg tablet Commonly known as:  TOPAMAX 1 po qhs  
  
 * Notice: This list has 2 medication(s) that are the same as other medications prescribed for you. Read the directions carefully, and ask your doctor or other care provider to review them with you. Follow-up Instructions Return in about 3 months (around 8/3/2017). Patient Instructions Check blood sugars before meals and at bedtime. If the bedtime sugars are less than 100 ,eat a 15 gm snack. Weight and diet control. Januvia 50 mg in AM  
 
  
Lantus or Novolin N cloudy insulin ( slow insulin ) 60  units in AM and 45 units at bedtime Novolin R clear insulin ( fast acting )  as needed for high sugars before meals   And take 5 units for snacks Blood sugar  Breakfast/Lunch/Dinner 150-200  Add 5  Units 201-250  Add 10 Units 251-300  Add  15 Units 301-350  Add 20 Units 351-400  Add 25  Units Introducing [facility]!    
 Theresa Bruce introduces [employer] patient portal. Now you can access parts of your medical record, email your doctor's office, and request medication refills online. 1. In your internet browser, go to [email or web address] 2. Click on the First Time User? Click Here link in the Sign In box. You will see the New Member Sign Up page. 3. Enter your [employer] Access Code exactly as it appears below. You will not need to use this code after youve completed the sign-up process. If you do not sign up before the expiration date, you must request a new code. · [employer] Access Code: [email or web address] Expires: 5/4/2017 11:58 AM 
 
4. Enter the last four digits of your Social Security Number (xxxx) and Date of Birth (mm/dd/yyyy) as indicated and click Submit. You will be taken to the next sign-up page. 5. Create a [employer] ID. This will be your [employer] login ID and cannot be changed, so think of one that is secure and easy to remember. 6. Create a [employer] password. You can change your password at any time. 7. Enter your Password Reset Question and Answer. This can be used at a later time if you forget your password. 8. Enter your e-mail address. You will receive e-mail notification when new information is available in [address] 9. Click Sign Up. You can now view and download portions of your medical record. 10. Click the Download Summary menu link to download a portable copy of your medical information. If you have questions, please visit the Frequently Asked Questions section of the [employer] website. Remember, [employer] is NOT to be used for urgent needs. For medical emergencies, dial 911. Now available from your iPhone and Android! Please provide this summary of care documentation to your next provider. Your primary care clinician is listed as Ricardo Sandoval. If you have any questions after today's visit, please call [phone].

## 2017-05-04 NOTE — TELEPHONE ENCOUNTER
Tried calling pt about her Latitude still not working but goes to message saying vm not setup then hangs up. Will try again later.

## 2017-07-11 ENCOUNTER — OFFICE VISIT (OUTPATIENT)
Dept: CARDIOLOGY CLINIC | Age: 62
End: 2017-07-11

## 2017-07-11 DIAGNOSIS — Z95.810 PRESENCE OF AUTOMATIC CARDIOVERTER/DEFIBRILLATOR (AICD): Primary | ICD-10-CM

## 2017-08-09 LAB
CREATININE, EXTERNAL: 1.4
HBA1C MFR BLD HPLC: 9.8 %
LDL-C, EXTERNAL: 95

## 2017-08-10 LAB — CREATININE, EXTERNAL: 1.3

## 2017-08-15 ENCOUNTER — OFFICE VISIT (OUTPATIENT)
Dept: ENDOCRINOLOGY | Age: 62
End: 2017-08-15

## 2017-08-15 VITALS
DIASTOLIC BLOOD PRESSURE: 74 MMHG | OXYGEN SATURATION: 96 % | TEMPERATURE: 97.9 F | BODY MASS INDEX: 34.38 KG/M2 | HEIGHT: 64 IN | HEART RATE: 92 BPM | WEIGHT: 201.4 LBS | SYSTOLIC BLOOD PRESSURE: 128 MMHG | RESPIRATION RATE: 18 BRPM

## 2017-08-15 DIAGNOSIS — E11.22 TYPE 2 DIABETES MELLITUS WITH DIABETIC CHRONIC KIDNEY DISEASE, UNSPECIFIED CKD STAGE, UNSPECIFIED LONG TERM INSULIN USE STATUS: ICD-10-CM

## 2017-08-15 DIAGNOSIS — E83.52 HYPERCALCEMIA: ICD-10-CM

## 2017-08-15 DIAGNOSIS — E66.9 OBESITY (BMI 30.0-34.9): ICD-10-CM

## 2017-08-15 DIAGNOSIS — Z79.4 TYPE 2 DIABETES MELLITUS WITH HYPERGLYCEMIA, WITH LONG-TERM CURRENT USE OF INSULIN (HCC): ICD-10-CM

## 2017-08-15 DIAGNOSIS — E11.65 TYPE 2 DIABETES MELLITUS WITH HYPERGLYCEMIA, WITH LONG-TERM CURRENT USE OF INSULIN (HCC): Primary | ICD-10-CM

## 2017-08-15 DIAGNOSIS — I10 ESSENTIAL HYPERTENSION: ICD-10-CM

## 2017-08-15 DIAGNOSIS — E11.65 TYPE 2 DIABETES MELLITUS WITH HYPERGLYCEMIA, WITH LONG-TERM CURRENT USE OF INSULIN (HCC): ICD-10-CM

## 2017-08-15 DIAGNOSIS — Z79.4 TYPE 2 DIABETES MELLITUS WITH HYPERGLYCEMIA, WITH LONG-TERM CURRENT USE OF INSULIN (HCC): Primary | ICD-10-CM

## 2017-08-15 DIAGNOSIS — E78.2 MIXED HYPERLIPIDEMIA: ICD-10-CM

## 2017-08-15 DIAGNOSIS — E66.09 NON MORBID OBESITY DUE TO EXCESS CALORIES: ICD-10-CM

## 2017-08-15 DIAGNOSIS — I10 ESSENTIAL HYPERTENSION WITH GOAL BLOOD PRESSURE LESS THAN 140/90: ICD-10-CM

## 2017-08-15 DIAGNOSIS — N17.9 ACUTE RENAL FAILURE, UNSPECIFIED ACUTE RENAL FAILURE TYPE (HCC): ICD-10-CM

## 2017-08-15 RX ORDER — INSULIN GLARGINE 100 [IU]/ML
INJECTION, SOLUTION SUBCUTANEOUS
Qty: 45 ML | Refills: 5 | Status: SHIPPED | OUTPATIENT
Start: 2017-08-15 | End: 2017-09-21 | Stop reason: ALTCHOICE

## 2017-08-15 RX ORDER — ATORVASTATIN CALCIUM 40 MG/1
TABLET, FILM COATED ORAL DAILY
COMMUNITY

## 2017-08-15 RX ORDER — LISINOPRIL 5 MG/1
5 TABLET ORAL DAILY
COMMUNITY
Start: 2017-08-10 | End: 2019-05-01

## 2017-08-15 RX ORDER — BUTALBITAL, ACETAMINOPHEN AND CAFFEINE 50; 325; 40 MG/1; MG/1; MG/1
TABLET ORAL
COMMUNITY
Start: 2017-07-25 | End: 2018-08-06

## 2017-08-15 RX ORDER — DENOSUMAB 60 MG/ML
INJECTION SUBCUTANEOUS
COMMUNITY
Start: 2017-07-25

## 2017-08-15 NOTE — PATIENT INSTRUCTIONS
Check blood sugars before meals and at bedtime. If the bedtime sugars are less than 100 ,eat a 15 gm snack. Weight and diet control.      Januvia 50 mg in AM        Basaglar or Novolin N cloudy insulin ( slow acting  insulin ) 60  units in AM and 35 units at bedtime    Novolin R clear insulin ( fast acting )  as needed for high sugars before meals   And take 5 units for snacks     Blood sugar  Breakfast/Lunch/Dinner       150-200  Add 5  Units       201-250  Add 10 Units       251-300  Add  15 Units       301-350  Add 20 Units        351-400  Add 25  Units

## 2017-08-15 NOTE — PROGRESS NOTES
[facility]               Reubenkellie Foley MD        [address] [phone] Fax [phone]  [facility]) [address] [address] MY:[phone] Fax [phone] ( Monday)          Patient Information  Date:8/15/2017  Name : Tara Cardozo 58 y.o.     YOB: 1955         Referred by: self referred        Chief Complaint   Patient presents with    Diabetes    Cholesterol Problem    Hypertension       History of Present Illness: Tara Cardozo is a 58 y.o. female here for follow-up of  Type 2 Diabetes Mellitus. She has a longstanding history of type 2 diabetes mellitus. She was on Humulin 70/30 25 units before breakfast and 25 units at bedtime which was changed to NPH and regular insulin  Analogs were expensive - now on Basaglar   Metformin was discontinued due to CKD    no hypoglycemia  Here with an aid who reports unhealthy and high carb diet - on soft drinks which she has decreased only since ED visit for CHF  Checking glucose at home    SMBG 3 - 4 /day    She has  history of several myocardial infarctions, congestive heart failure. Her cardiologist is Dr. Su Bose. She has some memory issues, evaluated by psychologist as well as neurology.           Wt Readings from Last 3 Encounters:   08/15/17 201 lb 6.4 oz (91.4 kg)   05/03/17 204 lb 9.6 oz (92.8 kg)   02/03/17 206 lb 11.2 oz (93.8 kg)       BP Readings from Last 3 Encounters:   08/15/17 128/74   05/03/17 134/70   02/22/17 138/70           Past Medical History:   Diagnosis Date    Anxiety     Asthma     Carpal tunnel syndrome on both sides     Chronic mental illness     Coronary artery disease     Depression     Fracture     right ankle    GERD (gastroesophageal reflux disease)     Hypertension     Memory disorder     PUD (peptic ulcer disease)     Type II or unspecified type diabetes mellitus without mention of complication, uncontrolled     Vertigo Current Outpatient Prescriptions   Medication Sig    JANUVIA 50 mg tablet TAKE ONE TABLET BY MOUTH IN THE MORNING    spironolactone (ALDACTONE) 25 mg tablet Take  by mouth daily.  insulin regular (NOVOLIN R) 100 unit/mL injection Use with sliding scale Max units daily: 75    furosemide (LASIX) 20 mg tablet TAKE ONE TABLET BY MOUTH ONCE DAILY    insulin glargine (BASAGLAR KWIKPEN) 100 unit/mL (3 mL) pen Inject 60 units in the AM and 45 units at bedtime Stop NPH and Levemir    Insulin Needles, Disposable, 32 gauge x 5/32\" ndle Use to inject Basaglar BID Dx Code: E11.65    sertraline (ZOLOFT) 100 mg tablet Take 150 mg by mouth daily.  aspirin delayed-release 81 mg tablet Take  by mouth daily.  gabapentin (NEURONTIN) 100 mg capsule 100 mg two (2) times a day.  TRUEPLUS LANCETS 33 gauge misc     guaiFENesin SR (MUCINEX) 600 mg SR tablet Take 1 Tab by mouth two (2) times a day. (Patient taking differently: Take 600 mg by mouth two (2) times daily as needed.)    isosorbide mononitrate ER (IMDUR) 30 mg tablet Take 30 mg by mouth daily.  albuterol (PROAIR HFA) 90 mcg/actuation inhaler Take  by inhalation.  amLODIPine (NORVASC) 5 mg tablet Take 5 mg by mouth daily.  glucose blood VI test strips (TRUETEST TEST STRIPS) strip Test blood glucose 4 times daily    Lancets misc Test blood glucose 4 times daily    buPROPion XL (WELLBUTRIN XL) 300 mg XL tablet Take 300 mg by mouth every morning.  omeprazole (PRILOSEC) 40 mg capsule Take 20 mg by mouth daily.  zolpidem (AMBIEN) 10 mg tablet Take  by mouth nightly as needed for Sleep.  nitroglycerin (NITROSTAT) 0.4 mg SL tablet by SubLINGual route every five (5) minutes as needed for Chest Pain.  loratadine 10 mg cap Take  by mouth.     butalbital-acetaminophen-caffeine (FIORICET, ESGIC) -40 mg per tablet     PROLIA 60 mg/mL injection     lisinopril (PRINIVIL, ZESTRIL) 5 mg tablet     guaiFENesin-dextromethorphan SR (Rosendo & Rosendo DM) 600-30 mg per tablet Take 1 Tab by mouth two (2) times a day.  carvedilol (COREG) 6.25 mg tablet Take 6.25 mg by mouth two (2) times daily (with meals).  FERROUS FUMARATE (IRON PO) Take 65 mg by mouth daily.  PRENATAL VIT W-CA,FE,FA,<1 MG, (PRENATAL VITAMIN PO) Take 2 Tabs by mouth daily.  meclizine (ANTIVERT) 25 mg tablet Take 1 tablet by mouth three (3) times daily as needed.  pravastatin (PRAVACHOL) 40 mg tablet Take 40 mg by mouth nightly.  famotidine (PEPCID) 20 mg tablet Take 20 mg by mouth two (2) times a day.  calcium-cholecalciferol, D3, (CALCARB 600 WITH VITAMIN D) tablet Take 2 Tabs by mouth daily.  topiramate (TOPAMAX) 25 mg tablet 1 po qhs     No current facility-administered medications for this visit. Allergies   Allergen Reactions    Beef Derived (Bovine) Hives    Shellfish Derived Hives         Review of Systems:  -   - Eyes: no blurry vision no double vision  - Cardiovascular: no chest pain ,no palpitations  - Respiratory: no cough no shortness of breath  - Gastrointestinal: no dysphagia no  abdominal pain  -   -     Physical Examination:   Blood pressure 128/74, pulse 92, temperature 97.9 °F (36.6 °C), temperature source Oral, resp. rate 18, height 5' 4\" (1.626 m), weight 201 lb 6.4 oz (91.4 kg), SpO2 96 %. Estimated body mass index is 34.57 kg/(m^2) as calculated from the following:    Height as of this encounter: 5' 4\" (1.626 m). -   Weight as of this encounter: 201 lb 6.4 oz (91.4 kg). - General: pleasant, no distress, good eye contact  - HEENT: no pallor, no periorbital edema, EOMI  - Neck: supple, no thyromegaly  - Cardiovascular: regular, normal rate, normal S1 and S2,  - Respiratory: clear to auscultation bilaterally  - Gastrointestinal: soft, nontender, nondistended,  BS +  - Musculoskeletal: no edema,no ulcers  - Psychiatric: normal mood and affect  - Skin: color, texture, turgor normal.       Data Reviewed:     [] Glucose records reviewed.   [] See glucose records for details (to be scanned). [] A1C  [] Reviewed labs        Assessment/Plan:     1. Type 2 diabetes mellitus with hyperglycemia, with long-term current use of insulin ([address]    2. Mixed hyperlipidemia    3. Essential hypertension        1. 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 Reviewed the complications and importance of diet. Maintain the blood glucose log , with insulin doses - d/w aid again     Advised to check glucose 4 times daily  FLU annually ,Pneumovax ,aspirin daily,annual eye exam,microalbumin    2. HTN : Continue current therapy     3. Hyperlipidemia : Continue statin. 4. CKD  -    5 . Obesity Body mass index is 34.57 kg/(m^2). 6 .Osteoporosis - managed by PCP   Ankle fracture  GERD,       7 CAD/CHF - Dr Chari Hirsch, EF 20%    There are no Patient Instructions on file for this visit. Follow-up Disposition: Not on File    Thank you for allowing me to participate in the care of this patient.     Florentin Rivera MD

## 2017-08-15 NOTE — PROGRESS NOTES
Aleksandra Bryant is a 58 y.o. female here for   Chief Complaint   Patient presents with    Diabetes    Cholesterol Problem    Hypertension       Functional glucose monitor and record keeping system? - yes  Eye exam within last year? - July 2017  Foot exam within last year? - Jan 2017    1. Have you been to the ER, urgent care clinic since your last visit? Hospitalized since your last visit? - [address] for fluid in her lungs and heart, last week    2. Have you seen or consulted any other health care providers outside of the [address] [place] [address] since your last visit?   Include any pap smears or colon screening.- no      Lab Results   Component Value Date/Time    Hemoglobin A1c 8.9 03/30/2016 10:07 AM    Hemoglobin A1c (POC) 7.8 11/03/2016 10:15 AM    Hemoglobin A1c, External 7.7 03/23/2017       Wt Readings from Last 3 Encounters:   05/03/17 204 lb 9.6 oz (92.8 kg)   02/03/17 206 lb 11.2 oz (93.8 kg)   01/27/17 205 lb 6.4 oz (93.2 kg)     Temp Readings from Last 3 Encounters:   05/03/17 97.8 °F (36.6 °C) (Oral)   02/03/17 97.6 °F (36.4 °C) (Oral)   11/03/16 100 °F (37.8 °C) (Oral)     BP Readings from Last 3 Encounters:   05/03/17 134/70   02/22/17 138/70   02/03/17 128/69     Pulse Readings from Last 3 Encounters:   05/03/17 89   02/22/17 85   02/03/17 82

## 2017-08-15 NOTE — MR AVS SNAPSHOT
Visit Information Date & Time Provider Department Dept. Phone Encounter #  
 8/15/2017 10:30 AM Yaneth Craig MD [facility] [phone] [id number] Follow-up Instructions Return in about 4 months (around 12/15/2017). 10/24/2017 10:45 AM  
REMOTE OFFICE VISIT with [facility] (KEHINDE VENTURA) Appt Note: lat icd/stf  
 [address] [facility] 600 [address] [facility]  
[address] [facility] Upcoming Health Maintenance Date Due Hepatitis C Screening 1955 FOOT EXAM Q1 5/22/1965 EYE EXAM RETINAL OR DILATED Q1 5/22/1965 Pneumococcal 19-64 Highest Risk (1 of 3 - PCV13) 5/22/1974 DTaP/Tdap/Td series (1 - Tdap) 5/22/1976 PAP AKA CERVICAL CYTOLOGY 5/22/1976 BREAST CANCER SCRN MAMMOGRAM 5/22/2005 FOBT Q 1 YEAR AGE 50-75 5/22/2005 ZOSTER VACCINE AGE 60> 3/22/2015 INFLUENZA AGE 9 TO ADULT 8/1/2017 HEMOGLOBIN A1C Q6M 9/23/2017 MICROALBUMIN Q1 3/23/2018 LIPID PANEL Q1 3/23/2018 Allergies as of 8/15/2017  Review Complete On: 8/15/2017 By: Yaneth Craig MD  
  
 Severity Noted Reaction Type Reactions Beef Derived (Bovine)  04/23/2014    Hives Shellfish Derived  04/23/2014    Hives Current Immunizations  Never Reviewed No immunizations on file. Not reviewed this visit You Were Diagnosed With   
  
 Codes Comments Type 2 diabetes mellitus with hyperglycemia, with long-term current use of insulin (HCC)    -  Primary ICD-10-CM: E11.65, Z79.4 ICD-9-CM: 250.00, 790.29, V58.67 Mixed hyperlipidemia     ICD-10-CM: E78.2 ICD-9-CM: 272.2 Essential hypertension     ICD-10-CM: I10 
ICD-9-CM: 401.9 Non morbid obesity due to excess calories     ICD-10-CM: E66.09 
ICD-9-CM: 278.00 Vitals BP Pulse Temp Resp Height(growth percentile) Weight(growth percentile) 128/74 (BP 1 Location: Right arm, BP Patient Position: Sitting) 92 97.9 °F (36.6 °C) (Oral) 18 5' 4\" (1.626 m) 201 lb 6.4 oz (91.4 kg) SpO2 BMI OB Status Smoking Status 96% 34.57 kg/m2 Menopause Never Smoker BMI and BSA Data Body Mass Index Body Surface Area 34.57 kg/m 2 2.03 m 2 Preferred Pharmacy Pharmacy Name Phone [facility] [address] [facility], [address] [place] [address] Your Updated Medication List  
  
   
This list is accurate as of: 8/15/17 10:56 AM.  Always use your most recent med list.  
  
  
  
  
 AMBIEN 10 mg tablet Generic drug:  zolpidem Take  by mouth nightly as needed for Sleep. amLODIPine 5 mg tablet Commonly known as:  Heladio Ape Take 5 mg by mouth daily. aspirin delayed-release 81 mg tablet Take  by mouth daily. atorvastatin 40 mg tablet Commonly known as:  LIPITOR Take  by mouth daily. buPROPion  mg XL tablet Commonly known as:  Jose Francisco Gain Take 300 mg by mouth every morning. butalbital-acetaminophen-caffeine -40 mg per tablet Commonly known as:  FIORICET, ESGIC  
  
 CALCARB 600 WITH VITAMIN D tablet Generic drug:  calcium-cholecalciferol (D3) Take 2 Tabs by mouth daily. carvedilol 6.25 mg tablet Commonly known as:  Carrie Salts Take 6.25 mg by mouth two (2) times daily (with meals). furosemide 20 mg tablet Commonly known as:  LASIX TAKE ONE TABLET BY MOUTH ONCE DAILY  
  
 gabapentin 100 mg capsule Commonly known as:  NEURONTIN  
100 mg two (2) times a day. glucose blood VI test strips strip Commonly known as:  TRUETEST TEST STRIPS Test blood glucose 4 times daily  
  
 guaiFENesin  mg SR tablet Commonly known as:  Rosendo & Rosendo Take 1 Tab by mouth two (2) times a day. insulin glargine 100 unit/mL (3 mL) Inpn Commonly known asGeorgeanna Cone Inject 60 units in the AM and 45 units at bedtime Stop NPH and Levemir  Insulin Needles (Disposable) 32 gauge x 5/32\" Ndle Use to inject Basaglar BID Dx Code: E11.65  
  
 insulin regular 100 unit/mL injection Commonly known as:  NovoLIN R Use with sliding scale Max units daily: 75 IRON PO Take 65 mg by mouth daily. isosorbide mononitrate ER 30 mg tablet Commonly known as:  IMDUR Take 30 mg by mouth daily. JANUVIA 50 mg tablet Generic drug:  SITagliptin TAKE ONE TABLET BY MOUTH IN THE MORNING * Lancets Misc Test blood glucose 4 times daily * TRUEPLUS LANCETS 33 gauge Misc Generic drug:  lancets  
  
 lisinopril 5 mg tablet Commonly known as:  PRINIVIL, ZESTRIL  
  
 loratadine 10 mg Cap Take  by mouth.  
  
 meclizine 25 mg tablet Commonly known as:  ANTIVERT Take 1 tablet by mouth three (3) times daily as needed. MUCINEX -30 mg per tablet Generic drug:  guaiFENesin-dextromethorphan SR Take 1 Tab by mouth two (2) times a day. NITROSTAT 0.4 mg SL tablet Generic drug:  nitroglycerin  
by SubLINGual route every five (5) minutes as needed for Chest Pain. omeprazole 40 mg capsule Commonly known as:  PRILOSEC Take 20 mg by mouth daily. PEPCID 20 mg tablet Generic drug:  famotidine Take 20 mg by mouth two (2) times a day. PRAVACHOL 40 mg tablet Generic drug:  pravastatin Take 40 mg by mouth nightly. PRENATAL VITAMIN PO Take 2 Tabs by mouth daily. PROAIR HFA 90 mcg/actuation inhaler Generic drug:  albuterol Take  by inhalation. PROLIA 60 mg/mL injection Generic drug:  denosumab  
  
 sertraline 100 mg tablet Commonly known as:  ZOLOFT Take 150 mg by mouth daily. spironolactone 25 mg tablet Commonly known as:  ALDACTONE Take  by mouth daily. topiramate 25 mg tablet Commonly known as:  TOPAMAX 1 po qhs  
  
 * Notice: This list has 2 medication(s) that are the same as other medications prescribed for you.  Read the directions carefully, and ask your doctor or other care provider to review them with you. Follow-up Instructions Return in about 4 months (around 12/15/2017). Patient Instructions Check blood sugars before meals and at bedtime. If the bedtime sugars are less than 100 ,eat a 15 gm snack. Weight and diet control. Januvia 50 mg in AM  
 
  
Basaglar or Novolin N cloudy insulin ( slow acting  insulin ) 60  units in AM and 35 units at bedtime Novolin R clear insulin ( fast acting )  as needed for high sugars before meals   And take 5 units for snacks Blood sugar  Breakfast/Lunch/Dinner 150-200  Add 5  Units 201-250  Add 10 Units 251-300  Add  15 Units 301-350  Add 20 Units 351-400  Add 25  Units Introducing [facility]! Radha Monzon introduces [employer] patient portal. Now you can access parts of your medical record, email your doctor's office, and request medication refills online. 1. In your internet browser, go to [email or web address] 2. Click on the First Time User? Click Here link in the Sign In box. You will see the New Member Sign Up page. 3. Enter your [employer] Access Code exactly as it appears below. You will not need to use this code after youve completed the sign-up process. If you do not sign up before the expiration date, you must request a new code. · [employer] Access Code: [email or web address] Expires: 10/9/2017 11:15 AM 
 
4. Enter the last four digits of your Social Security Number (xxxx) and Date of Birth (mm/dd/yyyy) as indicated and click Submit. You will be taken to the next sign-up page. 5. Create a Well[employer]elt ID. This will be your [employer] login ID and cannot be changed, so think of one that is secure and easy to remember. 6. Create a [employer] password. You can change your password at any time. 7. Enter your Password Reset Question and Answer. This can be used at a later time if you forget your password.   
8. Enter your e-mail address. You will receive e-mail notification when new information is available in [address] 9. Click Sign Up. You can now view and download portions of your medical record. 10. Click the Download Summary menu link to download a portable copy of your medical information. If you have questions, please visit the Frequently Asked Questions section of the [employer] website. Remember, [employer] is NOT to be used for urgent needs. For medical emergencies, dial 911. Now available from your iPhone and Android! Please provide this summary of care documentation to your next provider. Your primary care clinician is listed as Cynthia Da Silva. If you have any questions after today's visit, please call [phone].

## 2017-09-21 DIAGNOSIS — Z79.4 TYPE 2 DIABETES MELLITUS WITH HYPERGLYCEMIA, WITH LONG-TERM CURRENT USE OF INSULIN (HCC): Primary | ICD-10-CM

## 2017-09-21 DIAGNOSIS — E11.65 TYPE 2 DIABETES MELLITUS WITH HYPERGLYCEMIA, WITH LONG-TERM CURRENT USE OF INSULIN (HCC): Primary | ICD-10-CM

## 2017-09-21 RX ORDER — INSULIN GLARGINE 100 [IU]/ML
INJECTION, SOLUTION SUBCUTANEOUS
Qty: 45 ML | Refills: 11 | Status: SHIPPED | OUTPATIENT
Start: 2017-09-21 | End: 2018-04-17 | Stop reason: SDUPTHER

## 2017-10-24 ENCOUNTER — OFFICE VISIT (OUTPATIENT)
Dept: CARDIOLOGY CLINIC | Age: 62
End: 2017-10-24

## 2017-10-24 DIAGNOSIS — Z95.810 CARDIAC DEFIBRILLATOR IN SITU: Primary | ICD-10-CM

## 2018-01-04 LAB
ALBUMIN SERPL-MCNC: 4 G/DL (ref 3.6–4.8)
ALBUMIN/CREAT UR: 9 MG/G CREAT (ref 0–30)
ALBUMIN/GLOB SERPL: 1.3 {RATIO} (ref 1.2–2.2)
ALP SERPL-CCNC: 83 IU/L (ref 39–117)
ALT SERPL-CCNC: 20 IU/L (ref 0–32)
AST SERPL-CCNC: 14 IU/L (ref 0–40)
BILIRUB SERPL-MCNC: 0.2 MG/DL (ref 0–1.2)
BUN SERPL-MCNC: 40 MG/DL (ref 8–27)
BUN/CREAT SERPL: 27 (ref 12–28)
CALCIUM SERPL-MCNC: 10.3 MG/DL (ref 8.7–10.3)
CHLORIDE SERPL-SCNC: 101 MMOL/L (ref 96–106)
CHOLEST SERPL-MCNC: 111 MG/DL (ref 100–199)
CO2 SERPL-SCNC: 20 MMOL/L (ref 18–29)
CREAT SERPL-MCNC: 1.46 MG/DL (ref 0.57–1)
CREAT UR-MCNC: 52.3 MG/DL
EST. AVERAGE GLUCOSE BLD GHB EST-MCNC: 260 MG/DL
GLOBULIN SER CALC-MCNC: 3 G/DL (ref 1.5–4.5)
GLUCOSE SERPL-MCNC: 274 MG/DL (ref 65–99)
HBA1C MFR BLD: 10.7 % (ref 4.8–5.6)
HDLC SERPL-MCNC: 38 MG/DL
INTERPRETATION, 910389: NORMAL
INTERPRETATION: NORMAL
LDLC SERPL CALC-MCNC: 59 MG/DL (ref 0–99)
Lab: NORMAL
MICROALBUMIN UR-MCNC: 4.7 UG/ML
PDF IMAGE, 910387: NORMAL
POTASSIUM SERPL-SCNC: 4.7 MMOL/L (ref 3.5–5.2)
PROT SERPL-MCNC: 7 G/DL (ref 6–8.5)
SODIUM SERPL-SCNC: 138 MMOL/L (ref 134–144)
TRIGL SERPL-MCNC: 71 MG/DL (ref 0–149)
VLDLC SERPL CALC-MCNC: 14 MG/DL (ref 5–40)

## 2018-02-06 ENCOUNTER — OFFICE VISIT (OUTPATIENT)
Dept: CARDIOLOGY CLINIC | Age: 63
End: 2018-02-06

## 2018-02-06 DIAGNOSIS — Z95.810 CARDIAC DEFIBRILLATOR IN SITU: Primary | ICD-10-CM

## 2018-04-17 ENCOUNTER — OFFICE VISIT (OUTPATIENT)
Dept: ENDOCRINOLOGY | Age: 63
End: 2018-04-17

## 2018-04-17 VITALS
HEIGHT: 64 IN | OXYGEN SATURATION: 94 % | DIASTOLIC BLOOD PRESSURE: 54 MMHG | TEMPERATURE: 96.6 F | WEIGHT: 207.3 LBS | HEART RATE: 73 BPM | BODY MASS INDEX: 35.39 KG/M2 | SYSTOLIC BLOOD PRESSURE: 107 MMHG | RESPIRATION RATE: 18 BRPM

## 2018-04-17 DIAGNOSIS — E78.2 MIXED HYPERLIPIDEMIA: ICD-10-CM

## 2018-04-17 DIAGNOSIS — Z79.4 TYPE 2 DIABETES MELLITUS WITH HYPERGLYCEMIA, WITH LONG-TERM CURRENT USE OF INSULIN (HCC): ICD-10-CM

## 2018-04-17 DIAGNOSIS — E11.65 TYPE 2 DIABETES MELLITUS WITH HYPERGLYCEMIA, WITH LONG-TERM CURRENT USE OF INSULIN (HCC): Primary | ICD-10-CM

## 2018-04-17 DIAGNOSIS — Z79.4 TYPE 2 DIABETES MELLITUS WITH HYPERGLYCEMIA, WITH LONG-TERM CURRENT USE OF INSULIN (HCC): Primary | ICD-10-CM

## 2018-04-17 DIAGNOSIS — E11.65 TYPE 2 DIABETES MELLITUS WITH HYPERGLYCEMIA, WITH LONG-TERM CURRENT USE OF INSULIN (HCC): ICD-10-CM

## 2018-04-17 DIAGNOSIS — I10 ESSENTIAL HYPERTENSION: ICD-10-CM

## 2018-04-17 DIAGNOSIS — E11.22 TYPE 2 DIABETES MELLITUS WITH DIABETIC CHRONIC KIDNEY DISEASE, UNSPECIFIED CKD STAGE, UNSPECIFIED WHETHER LONG TERM INSULIN USE (HCC): Primary | ICD-10-CM

## 2018-04-17 PROBLEM — E11.40 TYPE 2 DIABETES MELLITUS WITH DIABETIC NEUROPATHY (HCC): Status: ACTIVE | Noted: 2018-04-17

## 2018-04-17 PROBLEM — E66.01 SEVERE OBESITY (BMI 35.0-39.9) WITH COMORBIDITY (HCC): Status: ACTIVE | Noted: 2018-04-17

## 2018-04-17 RX ORDER — BUDESONIDE AND FORMOTEROL FUMARATE DIHYDRATE 160; 4.5 UG/1; UG/1
2 AEROSOL RESPIRATORY (INHALATION) 2 TIMES DAILY
COMMUNITY
End: 2018-09-10

## 2018-04-17 RX ORDER — PROMETHAZINE HYDROCHLORIDE 6.25 MG/5ML
SYRUP ORAL
COMMUNITY
Start: 2018-03-14

## 2018-04-17 RX ORDER — INSULIN GLARGINE 100 [IU]/ML
INJECTION, SOLUTION SUBCUTANEOUS
Qty: 60 ML | Refills: 3 | Status: SHIPPED | OUTPATIENT
Start: 2018-04-17 | End: 2019-04-10 | Stop reason: SDUPTHER

## 2018-04-17 RX ORDER — INSULIN ASPART 100 [IU]/ML
INJECTION, SOLUTION INTRAVENOUS; SUBCUTANEOUS
Qty: 45 ML | Refills: 3 | Status: SHIPPED | OUTPATIENT
Start: 2018-04-17 | End: 2019-02-16 | Stop reason: SDUPTHER

## 2018-04-17 RX ORDER — LANOLIN ALCOHOL/MO/W.PET/CERES
CREAM (GRAM) TOPICAL
COMMUNITY
End: 2018-08-06 | Stop reason: DRUGHIGH

## 2018-04-17 NOTE — PROGRESS NOTES
Kevin Jamil is a 58 y.o. female here for   Chief Complaint   Patient presents with    Diabetes       Functional glucose monitor and record keeping system? - yes  Eye exam within last year? - Has appt on 4/27/18 Dr. Garza  exam within last year? - due    1. Have you been to the ER, urgent care clinic since your last visit? Hospitalized since your last visit? -no    2. Have you seen or consulted any other health care providers outside of the [address] [place] [address] since your last visit?   Include any pap smears or colon screening.- pulmonologist, doesn't remember name, and PCP      Lab Results   Component Value Date/Time    Hemoglobin A1c 10.7 (H) 01/03/2018 09:49 AM    Hemoglobin A1c (POC) 7.8 11/03/2016 10:15 AM    Hemoglobin A1c, External 9.8 08/09/2017       Wt Readings from Last 3 Encounters:   08/15/17 201 lb 6.4 oz (91.4 kg)   05/03/17 204 lb 9.6 oz (92.8 kg)   02/03/17 206 lb 11.2 oz (93.8 kg)     Temp Readings from Last 3 Encounters:   08/15/17 97.9 °F (36.6 °C) (Oral)   05/03/17 97.8 °F (36.6 °C) (Oral)   02/03/17 97.6 °F (36.4 °C) (Oral)     BP Readings from Last 3 Encounters:   08/15/17 128/74   05/03/17 134/70   02/22/17 138/70     Pulse Readings from Last 3 Encounters:   08/15/17 92   05/03/17 89   02/22/17 85

## 2018-04-17 NOTE — PATIENT INSTRUCTIONS
Check blood sugars before meals and at bedtime. If the bedtime sugars are less than 100 ,eat a 15 gm snack. Weight and diet control.      Januvia 50 mg in AM        Basaglar or LAntus or  Novolin N cloudy insulin ( slow acting  insulin ) 60 units between 7 - 8 PM     Apidra  ( fast acting )  15 units before breakfast , lunch and dinner     If sugar is > 200 before meals take Apidra 20 units

## 2018-04-17 NOTE — MR AVS SNAPSHOT
[address] [id number] 
[phone] Patient: Stacy Holguin MRN: [id number]:[id number] Visit Information Date & Time Provider Department Dept. Phone Encounter #  
 4/17/2018  9:45 AM Marianne Krishnamurthy MD [facility] [phone] [id number] Follow-up Instructions Return in about 2 months (around 6/17/2018). Upcoming Health Maintenance Date Due Hepatitis C Screening 1955 FOOT EXAM Q1 5/22/1965 EYE EXAM RETINAL OR DILATED Q1 5/22/1965 Pneumococcal 19-64 Highest Risk (1 of 3 - PCV13) 5/22/1974 DTaP/Tdap/Td series (1 - Tdap) 5/22/1976 PAP AKA CERVICAL CYTOLOGY 5/22/1976 BREAST CANCER SCRN MAMMOGRAM 5/22/2005 FOBT Q 1 YEAR AGE 50-75 5/22/2005 ZOSTER VACCINE AGE 60> 3/22/2015 Influenza Age 5 to Adult 8/1/2017 HEMOGLOBIN A1C Q6M 7/3/2018 MICROALBUMIN Q1 1/3/2019 LIPID PANEL Q1 1/3/2019 Allergies as of 4/17/2018  Review Complete On: 4/17/2018 By: Marianne Krishnamurthy MD  
  
 Severity Noted Reaction Type Reactions Beef Derived (Bovine)  04/23/2014    Hives Shellfish Derived  04/23/2014    Hives Current Immunizations  Never Reviewed No immunizations on file. Not reviewed this visit You Were Diagnosed With   
  
 Codes Comments Type 2 diabetes mellitus with diabetic chronic kidney disease, unspecified CKD stage, unspecified whether long term insulin use ([facility] [address]    -  Primary ICD-10-CM: E11.22 
ICD-9-CM: 250.40, 585.9 Mixed hyperlipidemia     ICD-10-CM: E78.2 ICD-9-CM: 272.2 Essential hypertension     ICD-10-CM: I10 
ICD-9-CM: 401.9 Vitals BP Pulse Temp Resp Height(growth percentile) Weight(growth percentile) 107/54 (BP 1 Location: Left arm, BP Patient Position: Sitting) 73 96.6 °F (35.9 °C) (Oral) 18 5' 4\" (1.626 m) 207 lb 4.8 oz (94 kg) SpO2 BMI OB Status Smoking Status  94% 35.58 kg/m2 Menopause Never Smoker Vitals History BMI and BSA Data Body Mass Index Body Surface Area 35.58 kg/m 2 2.06 m 2 Preferred Pharmacy Pharmacy Name Phone [address] [facility], [address] [phone] Your Updated Medication List  
  
   
This list is accurate as of 4/17/18 10:17 AM.  Always use your most recent med list.  
  
  
  
  
 AMBIEN 10 mg tablet Generic drug:  zolpidem Take  by mouth nightly as needed for Sleep. amLODIPine 5 mg tablet Commonly known as:  Job Dub Take 5 mg by mouth daily. aspirin delayed-release 81 mg tablet Take  by mouth daily. atorvastatin 40 mg tablet Commonly known as:  LIPITOR Take  by mouth daily. buPROPion  mg XL tablet Commonly known as:  [place] Games Take 300 mg by mouth every morning. butalbital-acetaminophen-caffeine -40 mg per tablet Commonly known as:  FIORICET, ESGIC  
  
 CALCARB 600 WITH VITAMIN D tablet Generic drug:  calcium-cholecalciferol (D3) Take 1 Tab by mouth daily. carvedilol 6.25 mg tablet Commonly known as:  Spencer Robles Take 6.25 mg by mouth two (2) times daily (with meals). furosemide 20 mg tablet Commonly known as:  LASIX TAKE ONE TABLET BY MOUTH ONCE DAILY  
  
 gabapentin 100 mg capsule Commonly known as:  NEURONTIN  
100 mg two (2) times a day. guaiFENesin  mg SR tablet Commonly known as:  [place] [address] Take 1 Tab by mouth two (2) times a day. insulin glargine 100 unit/mL (3 mL) Inpn Commonly known as:  LANTUS SOLOSTAR U-100 INSULIN Inject 60 units in the AM and 35 units at bedtime. Stop Basaglar Insulin Needles (Disposable) 32 gauge x 5/32\" Ndle Use to inject Basaglar BID Dx Code: E11.65  
  
 insulin regular 100 unit/mL injection Commonly known as:  NovoLIN R Regular U-100 Insuln Use with sliding scale Max units daily: 75 IRON PO Take 27 mg by mouth daily.   
  
 isosorbide mononitrate ER 30 mg tablet Commonly known as:  IMDUR Take 30 mg by mouth daily. JANUVIA 50 mg tablet Generic drug:  SITagliptin TAKE ONE TABLET BY MOUTH IN THE MORNING * Lancets Misc Test blood glucose 4 times daily * TRUEPLUS LANCETS 33 gauge Misc Generic drug:  lancets  
  
 lisinopril 5 mg tablet Commonly known as:  PRINIVIL, ZESTRIL  
  
 loratadine 10 mg Cap Take  by mouth.  
  
 meclizine 25 mg tablet Commonly known as:  ANTIVERT Take 1 tablet by mouth three (3) times daily as needed. melatonin 3 mg tablet Take  by mouth. MUCINEX -30 mg per tablet Generic drug:  guaiFENesin-dextromethorphan SR Take 1 Tab by mouth two (2) times a day. NITROSTAT 0.4 mg SL tablet Generic drug:  nitroglycerin  
by SubLINGual route every five (5) minutes as needed for Chest Pain. omeprazole 40 mg capsule Commonly known as:  PRILOSEC Take 20 mg by mouth daily. PEPCID 20 mg tablet Generic drug:  famotidine Take 20 mg by mouth two (2) times a day. PRAVACHOL 40 mg tablet Generic drug:  pravastatin Take 40 mg by mouth nightly. PRENATAL VITAMIN PO Take 2 Tabs by mouth daily. PROAIR HFA 90 mcg/actuation inhaler Generic drug:  albuterol Take  by inhalation. PROLIA 60 mg/mL injection Generic drug:  denosumab  
  
 promethazine 6.25 mg/5 mL syrup Commonly known as:  PHENERGAN  
  
 sertraline 100 mg tablet Commonly known as:  ZOLOFT Take 150 mg by mouth daily. SPIRIVA WITH HANDIHALER 18 mcg inhalation capsule Generic drug:  tiotropium Take 1 Cap by inhalation daily. spironolactone 25 mg tablet Commonly known as:  ALDACTONE Take  by mouth daily. SYMBICORT 160-4.5 mcg/actuation Hfaa Generic drug:  budesonide-formoterol Take 2 Puffs by inhalation two (2) times a day. topiramate 25 mg tablet Commonly known as:  TOPAMAX 1 po qhs  
  
 * Notice:   This list has 2 medication(s) that are the same as other medications prescribed for you. Read the directions carefully, and ask your doctor or other care provider to review them with you. Follow-up Instructions Return in about 2 months (around 6/17/2018). Patient Instructions Check blood sugars before meals and at bedtime. If the bedtime sugars are less than 100 ,eat a 15 gm snack. Weight and diet control. Januvia 50 mg in AM  
 
  
Basaglar or LAntus or  Novolin N cloudy insulin ( slow acting  insulin ) 60 units between 7 - 8 PM  
 
Apidra  ( fast acting )  15 units before breakfast , lunch and dinner If sugar is > 200 before meals take Apidra 20 units Introducing [facility]! Linda Renae introduces [employer] ([employer]) patient portal. Now you can access parts of your medical record, email your doctor's office, and request medication refills online. 1. In your internet browser, go to [email or web address] 2. Click on the First Time User? Click Here link in the Sign In box. You will see the New Member Sign Up page. 3. Enter your [employer] Access Code exactly as it appears below. You will not need to use this code after youve completed the sign-up process. If you do not sign up before the expiration date, you must request a new code. · [employer] ([employer] Access Code: [email or web address] Expires: 5/14/2018  3:23 PM 
 
4. Enter the last four digits of your Social Security Number (xxxx) and Date of Birth (mm/dd/yyyy) as indicated and click Submit. You will be taken to the next sign-up page. 5. Create a [employer] ID. This will be your [employer] login ID and cannot be changed, so think of one that is secure and easy to remember. 6. Create a [employer] password. You can change your password at any time. 7. Enter your Password Reset Question and Answer. This can be used at a later time if you forget your password. 8. Enter your e-mail address.  You will receive e-mail notification when new information is available in [employer]. 9. Click Sign Up. You can now view and download portions of your medical record. 10. Click the Download Summary menu link to download a portable copy of your medical information. If you have questions, please visit the Frequently Asked Questions section of the [employer] website. Remember, [employer] is NOT to be used for urgent needs. For medical emergencies, dial 911. Now available from your iPhone and Android! Please provide this summary of care documentation to your next provider. Your primary care clinician is listed as Demetria Bhatt. If you have any questions after today's visit, please call [phone].

## 2018-04-17 NOTE — PROGRESS NOTES
[facility]               Alanna Marks MD        [address] 78 [phone] Fax [phone]  [facility]) [address] [address] [address] VI:[phone] Fax [phone] ( Monday)          Patient Information  Date:4/18/2018  Name : Hong Soriano 58 y.o.     YOB: 1955         Referred by: self referred        Chief Complaint   Patient presents with    Diabetes       History of Present Illness: Hong Soriano is a 58 y.o. female here for follow-up of  Type 2 Diabetes Mellitus. She has a longstanding history of type 2 diabetes mellitus. Diet is not healthy   Reports to be taking insulin   Metformin was discontinued due to CKD    no hypoglycemia  Here with an aid who reports unhealthy and high carb diet - on soft drinks. She has  history of several myocardial infarctions, congestive heart failure. Her cardiologist is Dr. Genna Frye. She has some memory issues, evaluated by psychologist as well as neurology. Wt Readings from Last 3 Encounters:   04/17/18 207 lb 4.8 oz (94 kg)   08/15/17 201 lb 6.4 oz (91.4 kg)   05/03/17 204 lb 9.6 oz (92.8 kg)       BP Readings from Last 3 Encounters:   04/17/18 107/54   08/15/17 128/74   05/03/17 134/70           Past Medical History:   Diagnosis Date    Anxiety     Asthma     Carpal tunnel syndrome on both sides     Chronic mental illness     Coronary artery disease     Depression     Fracture     right ankle    GERD (gastroesophageal reflux disease)     Hypertension     Memory disorder     PUD (peptic ulcer disease)     Type II or unspecified type diabetes mellitus without mention of complication, uncontrolled     Vertigo      Current Outpatient Prescriptions   Medication Sig    promethazine (PHENERGAN) 6.25 mg/5 mL syrup     budesonide-formoterol (SYMBICORT) 160-4.5 mcg/actuation HFAA Take 2 Puffs by inhalation two (2) times a day.     tiotropium (SPIRIVA WITH HANDIHALER) 18 mcg inhalation capsule Take 1 Cap by inhalation daily.  melatonin 3 mg tablet Take  by mouth.  PROLIA 60 mg/mL injection     lisinopril (PRINIVIL, ZESTRIL) 5 mg tablet     atorvastatin (LIPITOR) 40 mg tablet Take  by mouth daily.  guaiFENesin-dextromethorphan SR (MUCINEX DM) 600-30 mg per tablet Take 1 Tab by mouth two (2) times a day.  spironolactone (ALDACTONE) 25 mg tablet Take  by mouth daily.  furosemide (LASIX) 20 mg tablet TAKE ONE TABLET BY MOUTH ONCE DAILY (Patient taking differently: 40 mg daily)    Insulin Needles, Disposable, 32 gauge x 5/32\" ndle Use to inject Basaglar BID Dx Code: E11.65    sertraline (ZOLOFT) 100 mg tablet Take 150 mg by mouth daily.  carvedilol (COREG) 6.25 mg tablet Take 6.25 mg by mouth two (2) times daily (with meals).  gabapentin (NEURONTIN) 100 mg capsule 100 mg two (2) times a day.  TRUEPLUS LANCETS 33 gauge misc     FERROUS FUMARATE (IRON PO) Take 27 mg by mouth daily.  guaiFENesin SR (MUCINEX) 600 mg SR tablet Take 1 Tab by mouth two (2) times a day. (Patient taking differently: Take 600 mg by mouth two (2) times daily as needed.)    isosorbide mononitrate ER (IMDUR) 30 mg tablet Take 30 mg by mouth daily.  albuterol (PROAIR HFA) 90 mcg/actuation inhaler Take  by inhalation.  amLODIPine (NORVASC) 5 mg tablet Take 5 mg by mouth daily.  Lancets misc Test blood glucose 4 times daily    buPROPion XL (WELLBUTRIN XL) 300 mg XL tablet Take 300 mg by mouth every morning.  omeprazole (PRILOSEC) 40 mg capsule Take 20 mg by mouth daily.  calcium-cholecalciferol, D3, (CALCARB 600 WITH VITAMIN D) tablet Take 1 Tab by mouth daily.  nitroglycerin (NITROSTAT) 0.4 mg SL tablet by SubLINGual route every five (5) minutes as needed for Chest Pain.  loratadine 10 mg cap Take  by mouth.     insulin glargine (LANTUS SOLOSTAR U-100 INSULIN) 100 unit/mL (3 mL) inpn Inject 60 units between 7 - 8 PM . Stop Basaglar    insulin aspart U-100 (NOVOLOG) 100 unit/mL inpn Inject 15 units before breakfast, lunch and dinner Stop Novolin R & Apidra    butalbital-acetaminophen-caffeine (FIORICET, ESGIC) -40 mg per tablet     JANUVIA 50 mg tablet TAKE ONE TABLET BY MOUTH IN THE MORNING    aspirin delayed-release 81 mg tablet Take  by mouth daily.  PRENATAL VIT W-CA,FE,FA,<1 MG, (PRENATAL VITAMIN PO) Take 2 Tabs by mouth daily.  meclizine (ANTIVERT) 25 mg tablet Take 1 tablet by mouth three (3) times daily as needed.  pravastatin (PRAVACHOL) 40 mg tablet Take 40 mg by mouth nightly.  famotidine (PEPCID) 20 mg tablet Take 20 mg by mouth two (2) times a day.  zolpidem (AMBIEN) 10 mg tablet Take  by mouth nightly as needed for Sleep.  topiramate (TOPAMAX) 25 mg tablet 1 po qhs     No current facility-administered medications for this visit. Allergies   Allergen Reactions    Beef Derived (Bovine) Hives    Shellfish Derived Hives         Review of Systems:  -   - Eyes: no blurry vision no double vision  - Cardiovascular: no chest pain ,no palpitations  - Respiratory: no cough no shortness of breath  - Gastrointestinal: no dysphagia no  abdominal pain  -   -     Physical Examination:   Blood pressure 107/54, pulse 73, temperature 96.6 °F (35.9 °C), temperature source Oral, resp. rate 18, height 5' 4\" (1.626 m), weight 207 lb 4.8 oz (94 kg), SpO2 94 %. Estimated body mass index is 35.58 kg/(m^2) as calculated from the following:    Height as of this encounter: 5' 4\" (1.626 m). -   Weight as of this encounter: 207 lb 4.8 oz (94 kg).   - General: pleasant, no distress, good eye contact  - HEENT: no pallor, no periorbital edema, EOMI  - Neck: supple, no thyromegaly  - Cardiovascular: regular, normal rate, normal S1 and S2,  - Respiratory: clear to auscultation bilaterally  - Gastrointestinal: soft, nontender, nondistended,  BS +  -   - Psychiatric: normal mood and affect  - Skin: color, texture, turgor normal.     Diabetic foot exam: April 2018    Left:     Vibratory sensation absent   Filament test decreased sensation with micro filament   Pulse DP: 1+    Deformities: None  Right:    Vibratory sensation absent   Filament test decreased sensation with micro filament   Pulse DP: 1+   Deformities: None    Data Reviewed:     [] Glucose records reviewed. [] See glucose records for details (to be scanned). [] A1C  [] Reviewed labs        Assessment/Plan:     1. Type 2 diabetes mellitus with diabetic chronic kidney disease, unspecified CKD stage, unspecified whether long term insulin use ([facility]    2. Mixed hyperlipidemia    3. Essential hypertension        1. 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 Reviewed the complications and importance of diet. Maintain the blood glucose log , with insulin doses - d/w aid again     Advised to check glucose 4 times daily  FLU annually ,Pneumovax ,aspirin daily,annual eye exam,microalbumin    2. HTN : Continue current therapy     3. Hyperlipidemia : Continue statin. 4. CKD  -    5 . Obesity Body mass index is 35.58 kg/(m^2). 6 .Osteoporosis - managed by PCP   Ankle fracture  GERD,       7 CAD/CHF - Dr Mortimer Kirks, EF 20%    Patient Instructions   Check blood sugars before meals and at bedtime. If the bedtime sugars are less than 100 ,eat a 15 gm snack. Weight and diet control.      Januvia 50 mg in AM        Basaglar or LAntus or  Novolin N cloudy insulin ( slow acting  insulin ) 60 units between 7 - 8 PM     Apidra  ( fast acting )  15 units before breakfast , lunch and dinner     If sugar is > 200 before meals take Apidra 20 units         Follow-up Disposition:  Return in about 2 months (around 6/17/2018). Thank you for allowing me to participate in the care of this patient.     Fernanda Rosa MD

## 2018-04-23 DIAGNOSIS — E11.65 TYPE 2 DIABETES MELLITUS WITH HYPERGLYCEMIA, UNSPECIFIED WHETHER LONG TERM INSULIN USE (HCC): Primary | ICD-10-CM

## 2018-04-23 NOTE — TELEPHONE ENCOUNTER
Patient was prescribed JANUVIA 50 mg tablet a new medication for her the pharmacy did not receive order for this medication and she does not have any in her home .

## 2018-06-04 ENCOUNTER — TELEPHONE (OUTPATIENT)
Dept: CARDIOLOGY CLINIC | Age: 63
End: 2018-06-04

## 2018-06-04 NOTE — TELEPHONE ENCOUNTER
Received an alert on Mon 6/4/18 that pt received a shock on Sat 6/2/18 8:34 pm for VT rates up to 238 bpm.    Tried calling pts only ph# listed & unable to l/m. Called pts emergency contact Alena Mullen & l/m on his only ph# listed. No other people listed on [employer]IP[employer].

## 2018-06-04 NOTE — TELEPHONE ENCOUNTER
Spoke to pt to see how she was doing at time of shock on Sat 6/2. Pt stated that she had been feeling ok just tired & she had been in her closet looking for something then got sweaty & received the shock. Pt stated that it knocked her backwards into the chair. Pt did not pass out or hit her head. Verified she has been staying hydrated & taking meds as prescribed. She stated earlier in the day she had been out shopping & felt fine. Has just felt tired since then. Has not felt heart racing or irregular. Pt stated that she has an appt to see Dr Simeon Young in the office tomorrow. Informed her to keep appt & bring all of her meds to appt to verify everything is ok. I will call his office to inform him of shock & fax copy to him for appt. Will give copy of shock to Dr Wes Parmar for him to see & address. Pt stated she understands.

## 2018-06-04 NOTE — TELEPHONE ENCOUNTER
Spoke to [facility] @ Dr Carlo Carlson office. Informed her of pts shock received on 6/2/18. Faxing over copy of report for her visit tomorrow & asked them to send Dr Lazcano Said her visit tomorrow.   Faxing to [phone]

## 2018-06-05 NOTE — TELEPHONE ENCOUNTER
Spoke to pt & she stated she saw Dr Vasu Pierson & he is having her get some labs done. He also changed 2 meds but she doesn't know what it is. I will call Dr Vasu Pierson office to get a copy of the visit & verify this info.

## 2018-06-07 NOTE — TELEPHONE ENCOUNTER
Called Dr Rosie Diehl office to get a copy of her office visit & labs faxed to us. She saw him Tues after she received a shock. Faxing to EP fax #.

## 2018-06-19 ENCOUNTER — TELEPHONE (OUTPATIENT)
Dept: CARDIOLOGY CLINIC | Age: 63
End: 2018-06-19

## 2018-06-19 NOTE — TELEPHONE ENCOUNTER
Patient would like a call back to speak with you. Patient kind of hard to understand.    Phone [phone]  Real Abundio

## 2018-06-19 NOTE — TELEPHONE ENCOUNTER
Pts sara didn't come this am to bring her to Dr Leydi Merritt appt for s/p ICD shock. Will have Antonia call to get her another appt to see him to discuss her recent shock. She can be reached at the verified [phone].   Thanks, [employer]

## 2018-07-05 ENCOUNTER — TELEPHONE (OUTPATIENT)
Dept: CARDIOLOGY CLINIC | Age: 63
End: 2018-07-05

## 2018-07-12 ENCOUNTER — OFFICE VISIT (OUTPATIENT)
Dept: CARDIOLOGY CLINIC | Age: 63
End: 2018-07-12

## 2018-07-12 DIAGNOSIS — Z95.810 CARDIAC DEFIBRILLATOR IN SITU: Primary | ICD-10-CM

## 2018-08-06 ENCOUNTER — OFFICE VISIT (OUTPATIENT)
Dept: CARDIOLOGY CLINIC | Age: 63
End: 2018-08-06

## 2018-08-06 VITALS
SYSTOLIC BLOOD PRESSURE: 122 MMHG | RESPIRATION RATE: 20 BRPM | WEIGHT: 215.4 LBS | OXYGEN SATURATION: 93 % | HEART RATE: 92 BPM | DIASTOLIC BLOOD PRESSURE: 64 MMHG | HEIGHT: 64 IN | BODY MASS INDEX: 36.77 KG/M2

## 2018-08-06 DIAGNOSIS — Z95.810 ICD (IMPLANTABLE CARDIOVERTER-DEFIBRILLATOR) IN PLACE: ICD-10-CM

## 2018-08-06 DIAGNOSIS — I42.9 CARDIOMYOPATHY, UNSPECIFIED TYPE (HCC): Primary | ICD-10-CM

## 2018-08-06 RX ORDER — CARVEDILOL 12.5 MG/1
12.5 TABLET ORAL 2 TIMES DAILY WITH MEALS
Qty: 60 TAB | Refills: 6 | Status: SHIPPED | OUTPATIENT
Start: 2018-08-06 | End: 2019-03-04 | Stop reason: SDUPTHER

## 2018-08-06 RX ORDER — OMEPRAZOLE 20 MG/1
20 CAPSULE, DELAYED RELEASE ORAL DAILY
COMMUNITY

## 2018-08-06 RX ORDER — FUROSEMIDE 40 MG/1
TABLET ORAL DAILY
COMMUNITY

## 2018-08-06 RX ORDER — MELATONIN 5 MG
5 CAPSULE ORAL
COMMUNITY
End: 2018-09-10

## 2018-08-06 RX ORDER — MONTELUKAST SODIUM 10 MG/1
10 TABLET ORAL DAILY
COMMUNITY

## 2018-08-06 NOTE — PROGRESS NOTES
Visit Vitals    /64 (BP 1 Location: Left arm, BP Patient Position: Sitting)    Pulse 92    Resp 20    Ht 5' 4\" (1.626 m)    Wt 215 lb 6.4 oz (97.7 kg)    SpO2 93%    BMI 36.97 kg/m2     Medication changes made  per verbal order of Dr. Nani Tyler

## 2018-08-06 NOTE — PROGRESS NOTES
HISTORY OF PRESENTING ILLNESS      Ramos Taveras is a 61 y.o. female with nonischemic cardiomyopathy, diabetes mellitus, hypertension, obesity and ICD who was recently evaluated in the emergency room for an episode of sudden onset syncope. She is uncertain whether she received an ICD shock. She denies incontinence of her urine, incontinence of her bowels, tongue biting. She sustained rib fractures as a result of the syncopal episode. She believes her blood sugar was low at the time. She has had syncope in the past.  Following evaluation emergency room she was discharged home. She now presents for further evaluation of her syncope. ICD interrogation reveals an ICD shock in June 2018 that was highly suspicious for atrial fibrillation. However there is no evidence of an arrhythmia or ICD shock last week. ACTIVE PROBLEM LIST     Patient Active Problem List    Diagnosis Date Noted    Type 2 diabetes mellitus with diabetic neuropathy ([facility] 04/17/2018    Severe obesity (BMI 35.0-39. 9) with comorbidity ([facility] 04/17/2018    Mixed hyperlipidemia 02/04/2017    Cardiomyopathy ([facility] 01/27/2017    Non morbid obesity 11/04/2016    Encounter for long-term (current) use of insulin ([facility] 06/14/2016    BMI 35.0-35.9,adult 01/04/2016    Essential hypertension 01/04/2016    Type 2 diabetes mellitus with diabetic chronic kidney disease ([facility] 01/04/2016    Obesity (BMI 30.0-34.9) 08/13/2015    Anxiety state, unspecified 07/02/2014    Invalid Neuropsych Profile With Very Strong Evidence Of Poor Test Taking Effort/Symptom Exaggeration/Malingering 04/24/2014    Memory loss 04/23/2014    Headache(784.0) 04/23/2014           PAST MEDICAL HISTORY     Past Medical History:   Diagnosis Date    Anxiety     Asthma     Carpal tunnel syndrome on both sides     Chronic mental illness     Coronary artery disease     Depression     Fracture     right ankle    GERD (gastroesophageal reflux disease)     Hypertension     Memory disorder     PUD (peptic ulcer disease)     Type II or unspecified type diabetes mellitus without mention of complication, uncontrolled     Vertigo            PAST SURGICAL HISTORY     Past Surgical History:   Procedure Laterality Date    HX ANKLE FRACTURE TX      HX CORONARY STENT PLACEMENT  2014    HX FREE SKIN GRAFT      HX ORTHOPAEDIC            ALLERGIES     Allergies   Allergen Reactions    Beef Derived (Bovine) Hives    Shellfish Derived Hives          FAMILY HISTORY     Family History   Problem Relation Age of Onset    Cancer Mother     negative for cardiac disease       SOCIAL HISTORY     Social History     Social History    Marital status:      Spouse name: N/A    Number of children: N/A    Years of education: N/A     Social History Main Topics    Smoking status: Never Smoker    Smokeless tobacco: Never Used    Alcohol use No    Drug use: No    Sexual activity: Not Currently     Other Topics Concern    Not on file     Social History Narrative         MEDICATIONS     Current Outpatient Prescriptions   Medication Sig    SITagliptin (JANUVIA) 50 mg tablet TAKE ONE TABLET BY MOUTH IN THE MORNING    promethazine (PHENERGAN) 6.25 mg/5 mL syrup     budesonide-formoterol (SYMBICORT) 160-4.5 mcg/actuation HFAA Take 2 Puffs by inhalation two (2) times a day.  tiotropium (SPIRIVA WITH HANDIHALER) 18 mcg inhalation capsule Take 1 Cap by inhalation daily.  melatonin 3 mg tablet Take  by mouth.  insulin glargine (LANTUS SOLOSTAR U-100 INSULIN) 100 unit/mL (3 mL) inpn Inject 60 units between 7 - 8 PM . Stop Basaglar    insulin aspart U-100 (NOVOLOG) 100 unit/mL inpn Inject 15 units before breakfast, lunch and dinner Stop Novolin R & Apidra    butalbital-acetaminophen-caffeine (FIORICET, ESGIC) -40 mg per tablet     PROLIA 60 mg/mL injection     lisinopril (PRINIVIL, ZESTRIL) 5 mg tablet     atorvastatin (LIPITOR) 40 mg tablet Take  by mouth daily.  guaiFENesin-dextromethorphan SR (MUCINEX DM) 600-30 mg per tablet Take 1 Tab by mouth two (2) times a day.  spironolactone (ALDACTONE) 25 mg tablet Take  by mouth daily.  furosemide (LASIX) 20 mg tablet TAKE ONE TABLET BY MOUTH ONCE DAILY (Patient taking differently: 40 mg daily)    Insulin Needles, Disposable, 32 gauge x 5/32\" ndle Use to inject Basaglar BID Dx Code: E11.65    sertraline (ZOLOFT) 100 mg tablet Take 150 mg by mouth daily.  aspirin delayed-release 81 mg tablet Take  by mouth daily.  carvedilol (COREG) 6.25 mg tablet Take 6.25 mg by mouth two (2) times daily (with meals).  gabapentin (NEURONTIN) 100 mg capsule 100 mg two (2) times a day.  TRUEPLUS LANCETS 33 gauge misc     FERROUS FUMARATE (IRON PO) Take 27 mg by mouth daily.  PRENATAL VIT W-CA,FE,FA,<1 MG, (PRENATAL VITAMIN PO) Take 2 Tabs by mouth daily.  guaiFENesin SR (MUCINEX) 600 mg SR tablet Take 1 Tab by mouth two (2) times a day. (Patient taking differently: Take 600 mg by mouth two (2) times daily as needed.)    isosorbide mononitrate ER (IMDUR) 30 mg tablet Take 30 mg by mouth daily.  albuterol (PROAIR HFA) 90 mcg/actuation inhaler Take  by inhalation.  amLODIPine (NORVASC) 5 mg tablet Take 5 mg by mouth daily.  Lancets misc Test blood glucose 4 times daily    meclizine (ANTIVERT) 25 mg tablet Take 1 tablet by mouth three (3) times daily as needed.  buPROPion XL (WELLBUTRIN XL) 300 mg XL tablet Take 300 mg by mouth every morning.  omeprazole (PRILOSEC) 40 mg capsule Take 20 mg by mouth daily.  pravastatin (PRAVACHOL) 40 mg tablet Take 40 mg by mouth nightly.  famotidine (PEPCID) 20 mg tablet Take 20 mg by mouth two (2) times a day.  zolpidem (AMBIEN) 10 mg tablet Take  by mouth nightly as needed for Sleep.  calcium-cholecalciferol, D3, (CALCARB 600 WITH VITAMIN D) tablet Take 1 Tab by mouth daily.     nitroglycerin (NITROSTAT) 0.4 mg SL tablet by SubLINGual route every five (5) minutes as needed for Chest Pain.  loratadine 10 mg cap Take  by mouth.  topiramate (TOPAMAX) 25 mg tablet 1 po qhs     No current facility-administered medications for this visit. I have reviewed the nurses notes, vitals, problem list, allergy list, medical history, family, social history and medications. REVIEW OF SYMPTOMS      General: Pt denies excessive weight gain or loss. Pt is able to conduct ADL's  HEENT: Denies blurred vision, headaches, hearing loss, epistaxis and difficulty swallowing. Respiratory: Denies cough, congestion, shortness of breath, BECKWITH, wheezing or stridor. Cardiovascular: Denies precordial pain, palpitations, edema or PND  Gastrointestinal: Denies poor appetite, indigestion, abdominal pain or blood in stool  Genitourinary: Denies hematuria, dysuria, increased urinary frequency  Musculoskeletal: Denies joint pain or swelling from muscles or joints  Neurologic: Denies tremor, paresthesias, headache, or sensory motor disturbance  Psychiatric: Denies confusion, insomnia, depression  Integumentray: Denies rash, itching or ulcers. Hematologic: Denies easy bruising, bleeding       PHYSICAL EXAMINATION      There were no vitals filed for this visit. General: Well developed, in no acute distress. HEENT: No jaundice, oral mucosa moist, no oral ulcers  Neck: Supple, no stiffness, no lymphadenopathy, supple  Heart:  Normal S1/S2 negative S3 or S4. Regular, no murmur, gallop or rub, no jugular venous distention  Respiratory: Clear bilaterally x 4, no wheezing or rales  Abdomen:   Soft, non-tender, bowel sounds are active.   Extremities:  No edema, normal cap refill, no cyanosis. Musculoskeletal: No clubbing, no deformities  Neuro: A&Ox3, speech clear, gait stable, cooperative, no focal neurologic deficits  Skin: Skin color is normal. No rashes or lesions.  Non diaphoretic, moist.  Vascular: 2+ pulses symmetric in all extremities       DIAGNOSTIC DATA      EKG: Sinus rhythm LABORATORY DATA    No results found for: WBC, HGBPOC, HGB, HGBP, HCTPOC, HCT, PHCT, RBCH, PLT, MCV, HGBEXT, HCTEXT, PLTEXT   Lab Results   Component Value Date/Time    Sodium 138 01/03/2018 09:49 AM    Potassium 4.7 01/03/2018 09:49 AM    Chloride 101 01/03/2018 09:49 AM    CO2 20 01/03/2018 09:49 AM    Glucose 274 (H) 01/03/2018 09:49 AM    BUN 40 (H) 01/03/2018 09:49 AM    Creatinine 1.46 (H) 01/03/2018 09:49 AM    BUN/Creatinine ratio 27 01/03/2018 09:49 AM    GFR est AA 44 (L) 01/03/2018 09:49 AM    GFR est non-AA 38 (L) 01/03/2018 09:49 AM    Calcium 10.3 01/03/2018 09:49 AM    Bilirubin, total 0.2 01/03/2018 09:49 AM    AST (SGOT) 14 01/03/2018 09:49 AM    Alk. phosphatase 83 01/03/2018 09:49 AM    Protein, total 7.0 01/03/2018 09:49 AM    Albumin 4.0 01/03/2018 09:49 AM    A-G Ratio 1.3 01/03/2018 09:49 AM    ALT (SGPT) 20 01/03/2018 09:49 AM           ASSESSMENT          1. Cardiomyopathy                        A. Non-ischemic                        B. NYHA class 2  2. CAD                        A. Native  3. Percutaneous coronary transluminal angioplasty   4. ICD                        A. [employer]                        B. Single chamber                        C. Primary prevention  5. Atrial fibrillation   A. Inappropriate ICD shock       PLAN     Given recent atrial fibrillation episode, will increase Coreg to 12.5 mg twice daily and start Eliquis 5 mg twice daily. Suspect syncope is of noncardiac etiology, potentially due to hypoglycemia. FOLLOW-UP     1 month with Theo Clarke      Thank you, Tatum Ritter MD and Dr. Georgia Kapadia for allowing me to participate in the care of this extraordinarily pleasant female. Please do not hesitate to contact me for further questions/concerns.          Robert Gardner MD  Cardiac Electrophysiology / Cardiology    [facility]  [address] [facility], [address] [place] [address] [address], [address] [place] [address]  [address]and Guti[address]rez [address]    [facility]  [phone] / [phone] Fax   [phone] / [phone] Fax

## 2018-08-07 DIAGNOSIS — Z95.810 ICD (IMPLANTABLE CARDIOVERTER-DEFIBRILLATOR) IN PLACE: ICD-10-CM

## 2018-08-07 DIAGNOSIS — I42.9 CARDIOMYOPATHY, UNSPECIFIED TYPE (HCC): ICD-10-CM

## 2018-08-07 NOTE — TELEPHONE ENCOUNTER
Pharmacy verified. Pt states that the pharmacy did not receive this prescription. Requested Prescriptions     Pending Prescriptions Disp Refills    apixaban (ELIQUIS) 5 mg tablet 60 Tab 6     Sig: Take 1 Tab by mouth two (2) times a day.      Thanks

## 2018-08-07 NOTE — TELEPHONE ENCOUNTER
Requested Prescriptions     Signed Prescriptions Disp Refills    apixaban (ELIQUIS) 5 mg tablet 60 Tab 6     Sig: Take 1 Tab by mouth two (2) times a day. Authorizing Provider: Balaji Mckoy     Ordering User: Solange Sanchez     Prescription resent per verbal order of Dr. Bert Nayak to [facility] DR CINDY BASURTO in [facility] as requested.

## 2018-09-10 ENCOUNTER — CLINICAL SUPPORT (OUTPATIENT)
Dept: CARDIOLOGY CLINIC | Age: 63
End: 2018-09-10

## 2018-09-10 ENCOUNTER — OFFICE VISIT (OUTPATIENT)
Dept: CARDIOLOGY CLINIC | Age: 63
End: 2018-09-10

## 2018-09-10 VITALS
HEART RATE: 76 BPM | RESPIRATION RATE: 24 BRPM | WEIGHT: 216 LBS | BODY MASS INDEX: 36.88 KG/M2 | SYSTOLIC BLOOD PRESSURE: 122 MMHG | HEIGHT: 64 IN | DIASTOLIC BLOOD PRESSURE: 68 MMHG | OXYGEN SATURATION: 94 %

## 2018-09-10 DIAGNOSIS — Z95.810 PRESENCE OF AUTOMATIC CARDIOVERTER/DEFIBRILLATOR (AICD): Primary | ICD-10-CM

## 2018-09-10 DIAGNOSIS — I42.9 CARDIOMYOPATHY, UNSPECIFIED TYPE (HCC): Primary | ICD-10-CM

## 2018-09-10 RX ORDER — FAMOTIDINE 20 MG/1
20 TABLET, FILM COATED ORAL
COMMUNITY

## 2018-09-10 RX ORDER — MELATONIN 5 MG
5 CAPSULE ORAL
COMMUNITY

## 2018-09-10 NOTE — PROGRESS NOTES
HISTORY OF PRESENTING ILLNESS      Johana Hernandez is a 61 y.o. female with nonischemic cardiomyopathy, diabetes mellitus, hypertension, obesity and ICD who was recently evaluated in the emergency room for an episode of sudden onset syncope. She is uncertain whether she received an ICD shock. She denies incontinence of her urine, incontinence of her bowels, tongue biting. She sustained rib fractures as a result of the syncopal episode. She believes her blood sugar was low at the time. She has had syncope in the past.  Following evaluation emergency room she was discharged home. She now presents for further evaluation of her syncope. ICD interrogation reveals an ICD shock in June 2018 that was highly suspicious for atrial fibrillation. Her coreg was adjusted and she was started on eliquis. She had a fall (unrelated to syncope) since her last visit. She denies recurrent episodes of syncope. Device interrogation today fails to reveal recurrent episodes of AF. She is tolerating her anticoagulation thus far. ACTIVE PROBLEM LIST     Patient Active Problem List    Diagnosis Date Noted    ICD (implantable cardioverter-defibrillator) in place 08/06/2018    Type 2 diabetes mellitus with diabetic neuropathy ([address] 04/17/2018    Severe obesity (BMI 35.0-39. 9) with comorbidity ([address] 04/17/2018    Mixed hyperlipidemia 02/04/2017    Cardiomyopathy ([address] 01/27/2017    Non morbid obesity 11/04/2016    Encounter for long-term (current) use of insulin ([address] 06/14/2016    BMI 35.0-35.9,adult 01/04/2016    Essential hypertension 01/04/2016    Type 2 diabetes mellitus with diabetic chronic kidney disease ([address] 01/04/2016    Obesity (BMI 30.0-34.9) 08/13/2015    Anxiety state, unspecified 07/02/2014    Invalid Neuropsych Profile With Very Strong Evidence Of Poor Test Taking Effort/Symptom Exaggeration/Malingering 04/24/2014    Memory loss 04/23/2014    Headache(784.0) 04/23/2014           PAST MEDICAL HISTORY     Past Medical History:   Diagnosis Date    Anxiety     Asthma     Carpal tunnel syndrome on both sides     Chronic mental illness     Coronary artery disease     Depression     Fracture     right ankle    GERD (gastroesophageal reflux disease)     Hypertension     Memory disorder     PUD (peptic ulcer disease)     Type II or unspecified type diabetes mellitus without mention of complication, uncontrolled     Vertigo            PAST SURGICAL HISTORY     Past Surgical History:   Procedure Laterality Date    HX ANKLE FRACTURE TX      HX CORONARY STENT PLACEMENT  2014    HX FREE SKIN GRAFT      HX ORTHOPAEDIC            ALLERGIES     Allergies   Allergen Reactions    Beef Derived (Bovine) Hives    Shellfish Derived Hives          FAMILY HISTORY     Family History   Problem Relation Age of Onset    Cancer Mother     negative for cardiac disease       SOCIAL HISTORY     Social History     Social History    Marital status:      Spouse name: N/A    Number of children: N/A    Years of education: N/A     Social History Main Topics    Smoking status: Never Smoker    Smokeless tobacco: Never Used    Alcohol use No    Drug use: No    Sexual activity: Not Currently     Other Topics Concern    Not on file     Social History Narrative         MEDICATIONS     Current Outpatient Prescriptions   Medication Sig    apixaban (ELIQUIS) 5 mg tablet Take 1 Tab by mouth two (2) times a day.  melatonin 5 mg cap capsule Take 5 mg by mouth nightly.  omeprazole (PRILOSEC) 20 mg capsule Take 20 mg by mouth daily.  furosemide (LASIX) 40 mg tablet Take  by mouth daily.  montelukast (SINGULAIR) 10 mg tablet Take 10 mg by mouth daily.  carvedilol (COREG) 12.5 mg tablet Take 1 Tab by mouth two (2) times daily (with meals).     SITagliptin (JANUVIA) 50 mg tablet TAKE ONE TABLET BY MOUTH IN THE MORNING    promethazine (PHENERGAN) 6.25 mg/5 mL syrup     budesonide-formoterol (SYMBICORT) 160-4.5 mcg/actuation HFAA Take 2 Puffs by inhalation two (2) times a day.  tiotropium (SPIRIVA WITH HANDIHALER) 18 mcg inhalation capsule Take 1 Cap by inhalation daily.  insulin glargine (LANTUS SOLOSTAR U-100 INSULIN) 100 unit/mL (3 mL) inpn Inject 60 units between 7 - 8 PM . Stop Basaglar    insulin aspart U-100 (NOVOLOG) 100 unit/mL inpn Inject 15 units before breakfast, lunch and dinner Stop Novolin R & Apidra    PROLIA 60 mg/mL injection     lisinopril (PRINIVIL, ZESTRIL) 5 mg tablet Take 5 mg by mouth daily.  atorvastatin (LIPITOR) 40 mg tablet Take  by mouth daily.  guaiFENesin-dextromethorphan SR (MUCINEX DM) 600-30 mg per tablet Take 1 Tab by mouth two (2) times a day.  spironolactone (ALDACTONE) 25 mg tablet Take  by mouth daily.  Insulin Needles, Disposable, 32 gauge x 5/32\" ndle Use to inject Basaglar BID Dx Code: E11.65    sertraline (ZOLOFT) 100 mg tablet Take 150 mg by mouth daily.  aspirin delayed-release 81 mg tablet Take  by mouth daily.  gabapentin (NEURONTIN) 100 mg capsule 100 mg two (2) times a day.  TRUEPLUS LANCETS 33 gauge misc     FERROUS FUMARATE (IRON PO) Take 27 mg by mouth daily.  PRENATAL VIT W-CA,FE,FA,<1 MG, (PRENATAL VITAMIN PO) Take 2 Tabs by mouth daily.  isosorbide mononitrate ER (IMDUR) 30 mg tablet Take 30 mg by mouth daily.  albuterol (PROAIR HFA) 90 mcg/actuation inhaler Take  by inhalation.  amLODIPine (NORVASC) 5 mg tablet Take 5 mg by mouth daily.  Lancets misc Test blood glucose 4 times daily    meclizine (ANTIVERT) 25 mg tablet Take 1 tablet by mouth three (3) times daily as needed.  buPROPion XL (WELLBUTRIN XL) 300 mg XL tablet Take 300 mg by mouth every morning.  zolpidem (AMBIEN) 10 mg tablet Take  by mouth nightly as needed for Sleep.  calcium-cholecalciferol, D3, (CALCARB 600 WITH VITAMIN D) tablet Take 1 Tab by mouth daily.     nitroglycerin (NITROSTAT) 0.4 mg SL tablet by SubLINGual route every five (5) minutes as needed for Chest Pain.  loratadine 10 mg cap Take  by mouth. No current facility-administered medications for this visit. I have reviewed the nurses notes, vitals, problem list, allergy list, medical history, family, social history and medications. REVIEW OF SYMPTOMS      General: Pt denies excessive weight gain or loss. Pt is able to conduct ADL's  HEENT: Denies blurred vision, headaches, hearing loss, epistaxis and difficulty swallowing. Respiratory: Denies cough, congestion, shortness of breath, BECKWITH, wheezing or stridor. Cardiovascular: Denies precordial pain, palpitations, edema or PND  Gastrointestinal: Denies poor appetite, indigestion, abdominal pain or blood in stool  Genitourinary: Denies hematuria, dysuria, increased urinary frequency  Musculoskeletal: Denies joint pain or swelling from muscles or joints  Neurologic: Denies tremor, paresthesias, headache, or sensory motor disturbance  Psychiatric: Denies confusion, insomnia, depression  Integumentray: Denies rash, itching or ulcers. Hematologic: Denies easy bruising, bleeding       PHYSICAL EXAMINATION      There were no vitals filed for this visit. General: Well developed, in no acute distress. HEENT: No jaundice, oral mucosa moist, no oral ulcers  Neck: Supple, no stiffness, no lymphadenopathy, supple  Heart:  Normal S1/S2 negative S3 or S4. Regular, no murmur, gallop or rub, no jugular venous distention  Respiratory: Clear bilaterally x 4, no wheezing or rales  Abdomen:   Soft, non-tender, bowel sounds are active.   Extremities:  No edema, normal cap refill, no cyanosis. Musculoskeletal: No clubbing, no deformities  Neuro: A&Ox3, speech clear, gait stable, cooperative, no focal neurologic deficits  Skin: Skin color is normal. No rashes or lesions.  Non diaphoretic, moist.  Vascular: 2+ pulses symmetric in all extremities       DIAGNOSTIC DATA      EKG:        LABORATORY DATA    No results found for: WBC, HGBPOC, HGB, HGBP, HCTPOC, HCT, PHCT, RBCH, PLT, MCV, HGBEXT, HCTEXT, PLTEXT   Lab Results   Component Value Date/Time    Sodium 138 01/03/2018 09:49 AM    Potassium 4.7 01/03/2018 09:49 AM    Chloride 101 01/03/2018 09:49 AM    CO2 20 01/03/2018 09:49 AM    Glucose 274 (H) 01/03/2018 09:49 AM    BUN 40 (H) 01/03/2018 09:49 AM    Creatinine 1.46 (H) 01/03/2018 09:49 AM    BUN/Creatinine ratio 27 01/03/2018 09:49 AM    GFR est AA 44 (L) 01/03/2018 09:49 AM    GFR est non-AA 38 (L) 01/03/2018 09:49 AM    Calcium 10.3 01/03/2018 09:49 AM    Bilirubin, total 0.2 01/03/2018 09:49 AM    AST (SGOT) 14 01/03/2018 09:49 AM    Alk. phosphatase 83 01/03/2018 09:49 AM    Protein, total 7.0 01/03/2018 09:49 AM    Albumin 4.0 01/03/2018 09:49 AM    A-G Ratio 1.3 01/03/2018 09:49 AM    ALT (SGPT) 20 01/03/2018 09:49 AM           ASSESSMENT      1. Cardiomyopathy                        A. Non-ischemic                        L. NYHA class 2  2. CAD                        H. Native  3. Percutaneous coronary transluminal angioplasty   4. ICD                        G. [employer]                        C. Single chamber--> CRTD                        C. Primary prevention  5. Atrial fibrillation                        A.  Inappropriate ICD shock                        B. AV node ablation       PLAN     Continue follow up in device clinic. Rate response adjusted. FOLLOW-UP     1 year with Murtaza Perez NP    Thank you, Megan Cazares NP and Dr. Lyn Wetzel for allowing me to participate in the care of this extraordinarily pleasant female. Please do not hesitate to contact me for further questions/concerns.          Dre Gomez MD  Cardiac Electrophysiology / Cardiology    [facility].  [address] [facility], [facility], [address], [address] [place] [address]  [phone] / [phone] Fax   [phone] / [phone] Fax

## 2018-09-10 NOTE — PROGRESS NOTES
Visit Vitals    /68 (BP 1 Location: Left arm, BP Patient Position: Sitting)    Pulse 76    Resp 24    Ht 5' 4\" (1.626 m)    Wt 216 lb (98 kg)    SpO2 94%    BMI 37.08 kg/m2     Medication changes made  per verbal order of Dr. Edu Crook

## 2018-09-28 ENCOUNTER — TELEPHONE (OUTPATIENT)
Dept: CARDIOLOGY CLINIC | Age: 63
End: 2018-09-28

## 2018-09-28 NOTE — TELEPHONE ENCOUNTER
Patient is asking that you call her  Harjeet Rodriguez) @ [phone] to speak with him about what was discussed at her last appointment   Phone: [phone] (patient)

## 2018-09-28 NOTE — TELEPHONE ENCOUNTER
Returned call. Spoke with patient's  regarding last findings from her last visit. No additional concerns.

## 2018-12-11 ENCOUNTER — OFFICE VISIT (OUTPATIENT)
Dept: CARDIOLOGY CLINIC | Age: 63
End: 2018-12-11

## 2018-12-11 DIAGNOSIS — Z95.810 CARDIAC DEFIBRILLATOR IN SITU: Primary | ICD-10-CM

## 2019-01-08 DIAGNOSIS — Z79.4 TYPE 2 DIABETES MELLITUS WITH HYPERGLYCEMIA, WITH LONG-TERM CURRENT USE OF INSULIN (HCC): ICD-10-CM

## 2019-01-08 DIAGNOSIS — E11.65 TYPE 2 DIABETES MELLITUS WITH HYPERGLYCEMIA, WITH LONG-TERM CURRENT USE OF INSULIN (HCC): ICD-10-CM

## 2019-01-08 RX ORDER — PEN NEEDLE, DIABETIC 31 GX3/16"
NEEDLE, DISPOSABLE MISCELLANEOUS
Qty: 100 PEN NEEDLE | Refills: 11 | Status: SHIPPED | OUTPATIENT
Start: 2019-01-08 | End: 2019-11-04 | Stop reason: SDUPTHER

## 2019-02-16 DIAGNOSIS — E11.65 TYPE 2 DIABETES MELLITUS WITH HYPERGLYCEMIA, WITH LONG-TERM CURRENT USE OF INSULIN (HCC): ICD-10-CM

## 2019-02-16 DIAGNOSIS — Z79.4 TYPE 2 DIABETES MELLITUS WITH HYPERGLYCEMIA, WITH LONG-TERM CURRENT USE OF INSULIN (HCC): ICD-10-CM

## 2019-02-16 RX ORDER — INSULIN ASPART 100 [IU]/ML
INJECTION, SOLUTION INTRAVENOUS; SUBCUTANEOUS
Qty: 45 ML | Refills: 3 | Status: SHIPPED | OUTPATIENT
Start: 2019-02-16 | End: 2019-04-10 | Stop reason: SDUPTHER

## 2019-03-04 DIAGNOSIS — Z95.810 ICD (IMPLANTABLE CARDIOVERTER-DEFIBRILLATOR) IN PLACE: ICD-10-CM

## 2019-03-04 DIAGNOSIS — I42.9 CARDIOMYOPATHY, UNSPECIFIED TYPE (HCC): ICD-10-CM

## 2019-03-05 RX ORDER — CARVEDILOL 12.5 MG/1
12.5 TABLET ORAL 2 TIMES DAILY WITH MEALS
Qty: 180 TAB | Refills: 3 | Status: SHIPPED | OUTPATIENT
Start: 2019-03-05

## 2019-04-01 DIAGNOSIS — E11.65 TYPE 2 DIABETES MELLITUS WITH HYPERGLYCEMIA, WITH LONG-TERM CURRENT USE OF INSULIN (HCC): ICD-10-CM

## 2019-04-01 DIAGNOSIS — Z79.4 TYPE 2 DIABETES MELLITUS WITH HYPERGLYCEMIA, WITH LONG-TERM CURRENT USE OF INSULIN (HCC): ICD-10-CM

## 2019-04-01 RX ORDER — INSULIN GLARGINE 100 [IU]/ML
INJECTION, SOLUTION SUBCUTANEOUS
Qty: 30 ML | Refills: 3 | Status: SHIPPED | OUTPATIENT
Start: 2019-04-01 | End: 2019-04-10

## 2019-04-10 ENCOUNTER — OFFICE VISIT (OUTPATIENT)
Dept: ENDOCRINOLOGY | Age: 64
End: 2019-04-10

## 2019-04-10 VITALS
OXYGEN SATURATION: 97 % | DIASTOLIC BLOOD PRESSURE: 66 MMHG | WEIGHT: 220 LBS | SYSTOLIC BLOOD PRESSURE: 113 MMHG | HEIGHT: 64 IN | TEMPERATURE: 97.2 F | RESPIRATION RATE: 18 BRPM | BODY MASS INDEX: 37.56 KG/M2 | HEART RATE: 85 BPM

## 2019-04-10 DIAGNOSIS — I10 ESSENTIAL HYPERTENSION: ICD-10-CM

## 2019-04-10 DIAGNOSIS — E11.65 TYPE 2 DIABETES MELLITUS WITH HYPERGLYCEMIA, WITH LONG-TERM CURRENT USE OF INSULIN (HCC): ICD-10-CM

## 2019-04-10 DIAGNOSIS — E78.2 MIXED HYPERLIPIDEMIA: ICD-10-CM

## 2019-04-10 DIAGNOSIS — Z79.4 TYPE 2 DIABETES MELLITUS WITH HYPERGLYCEMIA, WITH LONG-TERM CURRENT USE OF INSULIN (HCC): ICD-10-CM

## 2019-04-10 DIAGNOSIS — E11.40 TYPE 2 DIABETES MELLITUS WITH DIABETIC NEUROPATHY, WITH LONG-TERM CURRENT USE OF INSULIN (HCC): Primary | ICD-10-CM

## 2019-04-10 DIAGNOSIS — Z79.4 TYPE 2 DIABETES MELLITUS WITH DIABETIC NEUROPATHY, WITH LONG-TERM CURRENT USE OF INSULIN (HCC): Primary | ICD-10-CM

## 2019-04-10 RX ORDER — INSULIN GLARGINE 100 [IU]/ML
INJECTION, SOLUTION SUBCUTANEOUS
Qty: 60 ML | Refills: 3 | Status: SHIPPED | OUTPATIENT
Start: 2019-04-10 | End: 2019-08-22

## 2019-04-10 RX ORDER — INSULIN ASPART 100 [IU]/ML
INJECTION, SOLUTION INTRAVENOUS; SUBCUTANEOUS
Qty: 45 ML | Refills: 3 | Status: SHIPPED | OUTPATIENT
Start: 2019-04-10 | End: 2019-08-22 | Stop reason: SDUPTHER

## 2019-04-10 NOTE — LETTER
4/11/19 Patient: Gabby Avila YOB: 1955 Date of Visit: 4/10/2019 Keena Multani NP 
[address],[place] [id number] VIA Facsimile: [phone] Dear Keena Multani NP, Thank you for referring Ms. Rebekah Franco to [facility] for evaluation. My notes for this consultation are attached. If you have questions, please do not hesitate to call me. I look forward to following your patient along with you. Sincerely, Keli Segal MD

## 2019-04-10 NOTE — PROGRESS NOTES
Adelita Lopez is a 61 y.o. female here for   Chief Complaint   Patient presents with    Diabetes    Diabetic Foot Exam       Functional glucose monitor and record keeping system? - yes  Eye exam within last year? - on file   Foot exam within last year? - due    1. Have you been to the ER, urgent care clinic since your last visit? Hospitalized since your last visit? -no    2. Have you seen or consulted any other health care providers outside of the [address] [place] [address] since your last visit? Include any pap smears or colon screening. -PCP Shadi Roach and  Dr. Yas Lane, nephrology

## 2019-04-10 NOTE — PATIENT INSTRUCTIONS
Check blood sugars before meals and at bedtime. If the bedtime sugars are less than 100 ,eat a 15 gm snack. Weight and diet control.      Januvia 50 mg in AM        Basaglar or LAntus or  Novolin N cloudy insulin ( slow acting  insulin ) 60 units between 7 - 8 PM     Novolog or Humalog ( fast acting ) 10 -  15 units before breakfast , lunch and dinner     If sugar is > 200 before meals take NovoLog/Humalog 20 units       If on steroids Novolog 25 units before meals

## 2019-04-10 NOTE — PROGRESS NOTES
Carla Schaffer MD  A1c will still be high due to exacerbation      Patient Information  Date:4/11/2019  Name : Adelita Lopez 61 y.o.     YOB: 1955         Referred by: self referred        Chief Complaint   Patient presents with    Diabetes    Diabetic Foot Exam       History of Present Illness: Adelita Lopez is a 61 y.o. female here for follow-up of  Type 2 Diabetes Mellitus. She has a longstanding history of type 2 diabetes mellitus. Diet is not healthy   She was on steroids for bronchitis, blood glucose increased to 400 then, the last 2 weeks it has improved. She did not take the extra insulin per sliding scale  Reports to be taking insulin consistently, very limited activity due to COPD, has an aide who helps her. Admits to drinking sodas, on a high carbohydrate diet  Metformin was discontinued due to CKD    no hypoglycemia    She has  history of several myocardial infarctions, congestive heart failure. Her cardiologist is Dr. Sherie Palmer. She has some memory issues, evaluated by psychologist as well as neurology. Wt Readings from Last 3 Encounters:   04/10/19 220 lb (99.8 kg)   09/10/18 216 lb (98 kg)   08/06/18 215 lb 6.4 oz (97.7 kg)       BP Readings from Last 3 Encounters:   04/10/19 113/66   09/10/18 122/68   08/06/18 122/64           Past Medical History:   Diagnosis Date    Anxiety     Asthma     Asthma     Carpal tunnel syndrome on both sides     Chronic mental illness     Coronary artery disease     Depression     Fracture     right ankle    GERD (gastroesophageal reflux disease)     Hypertension     Memory disorder     PUD (peptic ulcer disease)     Type II or unspecified type diabetes mellitus without mention of complication, uncontrolled     Vertigo      Current Outpatient Medications   Medication Sig    carvedilol (COREG) 12.5 mg tablet Take 1 Tab by mouth two (2) times daily (with meals).     Insulin Needles, Disposable, 32 gauge x 5/32\" ndle USE TWICE DAILY    famotidine (PEPCID) 40 mg tablet Take 40 mg by mouth two (2) times a day.  melatonin 5 mg cap capsule Take 5 mg by mouth nightly.  apixaban (ELIQUIS) 5 mg tablet Take 1 Tab by mouth two (2) times a day.  furosemide (LASIX) 40 mg tablet Take  by mouth daily.  montelukast (SINGULAIR) 10 mg tablet Take 10 mg by mouth daily.  SITagliptin (JANUVIA) 50 mg tablet TAKE ONE TABLET BY MOUTH IN THE MORNING    promethazine (PHENERGAN) 6.25 mg/5 mL syrup     tiotropium (SPIRIVA WITH HANDIHALER) 18 mcg inhalation capsule Take 1 Cap by inhalation daily.  PROLIA 60 mg/mL injection     lisinopril (PRINIVIL, ZESTRIL) 5 mg tablet Take 5 mg by mouth daily.  atorvastatin (LIPITOR) 40 mg tablet Take  by mouth daily.  spironolactone (ALDACTONE) 25 mg tablet Take  by mouth daily.  sertraline (ZOLOFT) 100 mg tablet Take 150 mg by mouth daily.  gabapentin (NEURONTIN) 100 mg capsule 100 mg two (2) times a day.  TRUEPLUS LANCETS 33 gauge misc     FERROUS FUMARATE (IRON PO) Take 27 mg by mouth daily.  albuterol (PROAIR HFA) 90 mcg/actuation inhaler Take  by inhalation.  amLODIPine (NORVASC) 5 mg tablet Take 5 mg by mouth daily.  Lancets misc Test blood glucose 4 times daily    buPROPion XL (WELLBUTRIN XL) 300 mg XL tablet Take 300 mg by mouth every morning.  calcium-cholecalciferol, D3, (CALCARB 600 WITH VITAMIN D) tablet Take 1 Tab by mouth daily.  nitroglycerin (NITROSTAT) 0.4 mg SL tablet by SubLINGual route every five (5) minutes as needed for Chest Pain.  loratadine 10 mg cap Take  by mouth.     insulin glargine (LANTUS SOLOSTAR U-100 INSULIN) 100 unit/mL (3 mL) inpn Inject 60 units between 7 - 8 PM . Stop Basaglar    insulin aspart U-100 (NOVOLOG FLEXPEN U-100 INSULIN) 100 unit/mL inpn INJECT 10 -  15 units before breakfast , lunch and dinner  (STOP NOVOLIN R AND APIDRA)    omeprazole (PRILOSEC) 20 mg capsule Take 20 mg by mouth daily.  guaiFENesin-dextromethorphan SR (MUCINEX DM) 600-30 mg per tablet Take 1 Tab by mouth two (2) times a day.  aspirin delayed-release 81 mg tablet Take  by mouth daily.  PRENATAL VIT W-CA,FE,FA,<1 MG, (PRENATAL VITAMIN PO) Take 2 Tabs by mouth daily.  meclizine (ANTIVERT) 25 mg tablet Take 1 tablet by mouth three (3) times daily as needed.  zolpidem (AMBIEN) 10 mg tablet Take  by mouth nightly as needed for Sleep. No current facility-administered medications for this visit. Allergies   Allergen Reactions    Beef Derived (Bovine) Hives    Shellfish Derived Hives         Review of Systems:  -   - Eyes: no blurry vision no double vision  - Cardiovascular: no chest pain ,no palpitations  - Respiratory: no cough no shortness of breath  - Gastrointestinal: no dysphagia no  abdominal pain  -   -     Physical Examination:   Blood pressure 113/66, pulse 85, temperature 97.2 °F (36.2 °C), temperature source Oral, resp. rate 18, height 5' 4\" (1.626 m), weight 220 lb (99.8 kg), SpO2 97 %. Estimated body mass index is 37.76 kg/m² as calculated from the following:    Height as of this encounter: 5' 4\" (1.626 m). -   Weight as of this encounter: 220 lb (99.8 kg).   - General: pleasant, no distress, good eye contact  - HEENT: no pallor, no periorbital edema, EOMI  - Neck: supple, no thyromegaly  - Cardiovascular: regular, normal rate, normal S1 and S2,  - Respiratory: clear to auscultation bilaterally  - Gastrointestinal: soft, nontender, nondistended,  BS +  -   - Psychiatric: normal mood and affect  - Skin: color, texture, turgor normal.     Diabetic foot exam: April 2019    Left:     Vibratory sensation absent   Filament test decreased sensation with micro filament   Pulse DP: 1+    Deformities: Callus  Right:    Vibratory sensation absent   Filament test decreased sensation with micro filament   Pulse DP: 1+   Deformities: None    Data Reviewed:     [] Glucose records reviewed. [] See glucose records for details (to be scanned). [] A1C  [] Reviewed labs        Assessment/Plan:     1. Type 2 diabetes mellitus with diabetic neuropathy, with long-term current use of insulin ([address]    2. Essential hypertension    3. Mixed hyperlipidemia        1. Type 2 Diabetes Mellitus with macrovascular complications  Lab Results   Component Value Date/Time    Hemoglobin A1c 10.7 (H) 01/03/2018 09:49 AM    Hemoglobin A1c (POC) 7.8 11/03/2016 10:15 AM    Hemoglobin A1c, External 9.8 08/09/2017     Labs today  A1c likely will be elevated still as a result of hyperglycemia secondary to steroids  Basaglar/Lantus 60  units PM   NovoLog/Humalog 15 units AC   Diet not healthy: Stressed the importance of decreasing the carbohydrate portion, worsening insulin resistance  Januvia 50 mg  Very high risk for decompensation, call in 2 weeks if she has still hyperglycemia    Discussed the importance of checking home glucose regularly and taking all of their scheduled medications in order to have the best possible outcome. Reviewed the complications and importance of diet. Maintain the blood glucose log , with insulin doses - d/w aid again     Advised to check glucose 4 times daily  FLU annually ,Pneumovax ,aspirin daily,annual eye exam,microalbumin    2. HTN : Continue current therapy     3. Hyperlipidemia : Continue statin. 4. CKD  -    5 . Obesity Body mass index is 37.76 kg/m².        6 .Osteoporosis - managed by PCP   Ankle fracture  GERD,       7 CAD/CHF - Dr Hernández [place], EF 20%    Patient Instructions   Check blood sugars before meals and at bedtime. If the bedtime sugars are less than 100 ,eat a 15 gm snack. Weight and diet control.      Januvia 50 mg in AM        Basaglar or LAntus or  Novolin N cloudy insulin ( slow acting  insulin ) 60 units between 7 - 8 PM     Novolog or Humalog ( fast acting ) 10 -  15 units before breakfast , lunch and dinner     If sugar is > 200 before meals take NovoLog/Humalog 20 units       If on steroids Novolog 25 units before meals   Patient verbalized understanding  Follow-up and Dispositions    · Return in about 4 months (around 8/10/2019) for labs before next visit and follow up. Thank you for allowing me to participate in the care of this patient.     Johnny Luong MD

## 2019-04-11 LAB
ALBUMIN SERPL-MCNC: 4.3 G/DL (ref 3.6–4.8)
ALBUMIN/CREAT UR: 26.9 MG/G CREAT (ref 0–30)
ALBUMIN/GLOB SERPL: 1.5 {RATIO} (ref 1.2–2.2)
ALP SERPL-CCNC: 92 IU/L (ref 39–117)
ALT SERPL-CCNC: 28 IU/L (ref 0–32)
AST SERPL-CCNC: 19 IU/L (ref 0–40)
BILIRUB SERPL-MCNC: <0.2 MG/DL (ref 0–1.2)
BUN SERPL-MCNC: 31 MG/DL (ref 8–27)
BUN/CREAT SERPL: 20 (ref 12–28)
CALCIUM SERPL-MCNC: 10.5 MG/DL (ref 8.7–10.3)
CHLORIDE SERPL-SCNC: 104 MMOL/L (ref 96–106)
CO2 SERPL-SCNC: 24 MMOL/L (ref 20–29)
CREAT SERPL-MCNC: 1.54 MG/DL (ref 0.57–1)
CREAT UR-MCNC: 230.1 MG/DL
EST. AVERAGE GLUCOSE BLD GHB EST-MCNC: 189 MG/DL
GLOBULIN SER CALC-MCNC: 2.9 G/DL (ref 1.5–4.5)
GLUCOSE SERPL-MCNC: 110 MG/DL (ref 65–99)
HBA1C MFR BLD: 8.2 % (ref 4.8–5.6)
INTERPRETATION: NORMAL
Lab: NORMAL
MICROALBUMIN UR-MCNC: 61.9 UG/ML
POTASSIUM SERPL-SCNC: 4.6 MMOL/L (ref 3.5–5.2)
PROT SERPL-MCNC: 7.2 G/DL (ref 6–8.5)
SODIUM SERPL-SCNC: 143 MMOL/L (ref 134–144)

## 2019-04-22 ENCOUNTER — IP HISTORICAL/CONVERTED ENCOUNTER (OUTPATIENT)
Dept: OTHER | Age: 64
End: 2019-04-22

## 2019-05-01 ENCOUNTER — OFFICE VISIT (OUTPATIENT)
Dept: CARDIOLOGY CLINIC | Age: 64
End: 2019-05-01

## 2019-05-01 ENCOUNTER — CLINICAL SUPPORT (OUTPATIENT)
Dept: CARDIOLOGY CLINIC | Age: 64
End: 2019-05-01

## 2019-05-01 VITALS
SYSTOLIC BLOOD PRESSURE: 110 MMHG | RESPIRATION RATE: 16 BRPM | OXYGEN SATURATION: 94 % | WEIGHT: 218 LBS | HEART RATE: 93 BPM | BODY MASS INDEX: 37.22 KG/M2 | DIASTOLIC BLOOD PRESSURE: 66 MMHG | HEIGHT: 64 IN

## 2019-05-01 DIAGNOSIS — Z95.810 ICD (IMPLANTABLE CARDIOVERTER-DEFIBRILLATOR) IN PLACE: ICD-10-CM

## 2019-05-01 DIAGNOSIS — Z95.810 CARDIAC DEFIBRILLATOR IN SITU: Primary | ICD-10-CM

## 2019-05-01 DIAGNOSIS — I48.91 ATRIAL FIBRILLATION, UNSPECIFIED TYPE (HCC): Primary | ICD-10-CM

## 2019-05-01 DIAGNOSIS — J44.9 CHRONIC OBSTRUCTIVE PULMONARY DISEASE, UNSPECIFIED COPD TYPE (HCC): ICD-10-CM

## 2019-05-01 DIAGNOSIS — I42.9 CARDIOMYOPATHY, UNSPECIFIED TYPE (HCC): ICD-10-CM

## 2019-05-01 DIAGNOSIS — E66.9 OBESITY (BMI 30.0-34.9): ICD-10-CM

## 2019-05-01 RX ORDER — AMIODARONE HYDROCHLORIDE 200 MG/1
200 TABLET ORAL DAILY
Refills: 0 | Status: ON HOLD | COMMUNITY
Start: 2019-04-24 | End: 2019-08-12 | Stop reason: ALTCHOICE

## 2019-05-01 NOTE — PROGRESS NOTES
1. Have you been to the ER, urgent care clinic since your last visit? Hospitalized since your last visit? Yes When: 4/27/2019 Where: [facility]  Reason for visit: CHF    2. Have you seen or consulted any other health care providers outside of the [address] [place] [address] since your last visit? Include any pap smears or colon screening. No     Pt reports Med Rec. Completed.        Chief Complaint   Patient presents with    Cardiomyopathy     ICD    Irregular Heart Beat     Visit Vitals  /66 (BP 1 Location: Right arm, BP Patient Position: Sitting)   Pulse 93   Resp 16   Ht 5' 4\" (1.626 m)   Wt 218 lb (98.9 kg)   SpO2 94%   BMI 37.42 kg/m²

## 2019-05-01 NOTE — PROGRESS NOTES
HISTORY OF PRESENTING Shyam Keane is a 61 y.o. female with nonischemic cardiomyopathy, diabetes mellitus, hypertension, obesity and ICD who was recently evaluated in the emergency room for an episode of sudden onset syncope. Kobe Vargas is uncertain whether she received an ICD shock.  She denies incontinence of her urine, incontinence of her bowels, tongue biting.  She sustained rib fractures as a result of the syncopal episode.  She believes her blood sugar was low at the time.  She has had syncope in the past.  Following evaluation emergency room she was discharged home.  She now presents for further evaluation of her syncope. ICD interrogation reveals an ICD shock in June 2018 that was highly suspicious for atrial fibrillation. Her coreg was adjusted and she was started on eliquis. She had a fall (unrelated to syncope) since her last visit. She denies recurrent episodes of syncope. Device interrogation today fails to reveal recurrent episodes of AF. She is tolerating her anticoagulation thus far. Her rate response was adjusted. She returns today after recent ICD shock for VF for which she was seen in [facility] and was started on Amiodarone. Upon further review on her ICD interrogation today, shock EGMs shows that she received ATP and shock for AF. She reports feeling fatigue and has chronic sob d/t COPD, oxygen dependent. EKG shows sinus rhythm with PVC. Previous echocardiogram in 2016 demonstrated a reduced EF with mildly dilated LA. ACTIVE PROBLEM LIST     Patient Active Problem List    Diagnosis Date Noted    ICD (implantable cardioverter-defibrillator) in place 08/06/2018    Type 2 diabetes mellitus with diabetic neuropathy ([facility] 04/17/2018    Severe obesity (BMI 35.0-39. 9) with comorbidity ([address] 04/17/2018    Mixed hyperlipidemia 02/04/2017    Cardiomyopathy ([facility] 01/27/2017    Non morbid obesity 11/04/2016    Encounter for long-term (current) use of insulin ([facility] 06/14/2016    BMI 35.0-35.9,adult 01/04/2016    Essential hypertension 01/04/2016    Type 2 diabetes mellitus with diabetic chronic kidney disease ([facility] 01/04/2016    Obesity (BMI 30.0-34.9) 08/13/2015    Anxiety state, unspecified 07/02/2014    Invalid Neuropsych Profile With Very Strong Evidence Of Poor Test Taking Effort/Symptom Exaggeration/Malingering 04/24/2014    Memory loss 04/23/2014    Headache(784.0) 04/23/2014           PAST MEDICAL HISTORY     Past Medical History:   Diagnosis Date    Anxiety     Asthma     Asthma     Carpal tunnel syndrome on both sides     Chronic mental illness     Coronary artery disease     Depression     Fracture     right ankle    GERD (gastroesophageal reflux disease)     Hypertension     Memory disorder     PUD (peptic ulcer disease)     Type II or unspecified type diabetes mellitus without mention of complication, uncontrolled     Vertigo            PAST SURGICAL HISTORY     Past Surgical History:   Procedure Laterality Date    HX ANKLE FRACTURE TX      HX CORONARY STENT PLACEMENT  2014    HX FREE SKIN GRAFT      HX ORTHOPAEDIC            ALLERGIES     Allergies   Allergen Reactions    Beef Derived (Bovine) Hives    Shellfish Derived Hives          FAMILY HISTORY     Family History   Problem Relation Age of Onset    Cancer Mother     negative for cardiac disease       SOCIAL HISTORY     Social History     Socioeconomic History    Marital status:      Spouse name: Not on file    Number of children: Not on file    Years of education: Not on file    Highest education level: Not on file   Tobacco Use    Smoking status: Never Smoker    Smokeless tobacco: Never Used   Substance and Sexual Activity    Alcohol use: No    Drug use: No    Sexual activity: Not Currently         MEDICATIONS     Current Outpatient Medications   Medication Sig    insulin glargine (LANTUS SOLOSTAR U-100 INSULIN) 100 unit/mL (3 mL) inpn Inject 60 units between 7 - 8 PM . Stop Basaglar    insulin aspart U-100 (NOVOLOG FLEXPEN U-100 INSULIN) 100 unit/mL inpn INJECT 10 -  15 units before breakfast , lunch and dinner  (STOP NOVOLIN R AND APIDRA)    carvedilol (COREG) 12.5 mg tablet Take 1 Tab by mouth two (2) times daily (with meals).  Insulin Needles, Disposable, 32 gauge x 5/32\" ndle USE TWICE DAILY    famotidine (PEPCID) 40 mg tablet Take 40 mg by mouth two (2) times a day.  melatonin 5 mg cap capsule Take 5 mg by mouth nightly.  apixaban (ELIQUIS) 5 mg tablet Take 1 Tab by mouth two (2) times a day.  omeprazole (PRILOSEC) 20 mg capsule Take 20 mg by mouth daily.  furosemide (LASIX) 40 mg tablet Take  by mouth daily.  montelukast (SINGULAIR) 10 mg tablet Take 10 mg by mouth daily.  SITagliptin (JANUVIA) 50 mg tablet TAKE ONE TABLET BY MOUTH IN THE MORNING    promethazine (PHENERGAN) 6.25 mg/5 mL syrup     tiotropium (SPIRIVA WITH HANDIHALER) 18 mcg inhalation capsule Take 1 Cap by inhalation daily.  PROLIA 60 mg/mL injection     lisinopril (PRINIVIL, ZESTRIL) 5 mg tablet Take 5 mg by mouth daily.  atorvastatin (LIPITOR) 40 mg tablet Take  by mouth daily.  guaiFENesin-dextromethorphan SR (MUCINEX DM) 600-30 mg per tablet Take 1 Tab by mouth two (2) times a day.  spironolactone (ALDACTONE) 25 mg tablet Take  by mouth daily.  sertraline (ZOLOFT) 100 mg tablet Take 150 mg by mouth daily.  aspirin delayed-release 81 mg tablet Take  by mouth daily.  gabapentin (NEURONTIN) 100 mg capsule 100 mg two (2) times a day.  TRUEPLUS LANCETS 33 gauge misc     FERROUS FUMARATE (IRON PO) Take 27 mg by mouth daily.  PRENATAL VIT W-CA,FE,FA,<1 MG, (PRENATAL VITAMIN PO) Take 2 Tabs by mouth daily.  albuterol (PROAIR HFA) 90 mcg/actuation inhaler Take  by inhalation.  amLODIPine (NORVASC) 5 mg tablet Take 5 mg by mouth daily.     Lancets misc Test blood glucose 4 times daily    meclizine (ANTIVERT) 25 mg tablet Take 1 tablet by mouth three (3) times daily as needed.  buPROPion XL (WELLBUTRIN XL) 300 mg XL tablet Take 300 mg by mouth every morning.  zolpidem (AMBIEN) 10 mg tablet Take  by mouth nightly as needed for Sleep.  calcium-cholecalciferol, D3, (CALCARB 600 WITH VITAMIN D) tablet Take 1 Tab by mouth daily.  nitroglycerin (NITROSTAT) 0.4 mg SL tablet by SubLINGual route every five (5) minutes as needed for Chest Pain.  loratadine 10 mg cap Take  by mouth. No current facility-administered medications for this visit. I have reviewed the nurses notes, vitals, problem list, allergy list, medical history, family, social history and medications. REVIEW OF SYMPTOMS      General: Pt denies excessive weight gain or loss. Pt is able to conduct ADL's  HEENT: Denies blurred vision, headaches, hearing loss, epistaxis and difficulty swallowing. Respiratory: Denies cough, congestion, shortness of breath, BECKWITH, wheezing or stridor. Cardiovascular: Denies precordial pain, palpitations, edema or PND  Gastrointestinal: Denies poor appetite, indigestion, abdominal pain or blood in stool  Genitourinary: Denies hematuria, dysuria, increased urinary frequency  Musculoskeletal: Denies joint pain or swelling from muscles or joints  Neurologic: Denies tremor, paresthesias, headache, or sensory motor disturbance  Psychiatric: Denies confusion, insomnia, depression  Integumentray: Denies rash, itching or ulcers. Hematologic: Denies easy bruising, bleeding       PHYSICAL EXAMINATION      There were no vitals filed for this visit. General: Well developed, in no acute distress. HEENT: No jaundice, oral mucosa moist, no oral ulcers  Neck: Supple, no stiffness, no lymphadenopathy, supple  Heart:  Normal S1/S2 negative S3 or S4.  Regular, no murmur, gallop or rub, no jugular venous distention  Respiratory: Clear bilaterally x 4, no wheezing or rales  Abdomen:   Soft, non-tender, bowel sounds are active.   Extremities:  No edema, normal cap refill, no cyanosis. Musculoskeletal: No clubbing, no deformities  Neuro: A&Ox3, speech clear, gait stable, cooperative, no focal neurologic deficits  Skin: Skin color is normal. No rashes or lesions. Non diaphoretic, moist.  Vascular: 2+ pulses symmetric in all extremities       DIAGNOSTIC DATA      EKG:        LABORATORY DATA    No results found for: WBC, HGBPOC, HGB, HGBP, HCTPOC, HCT, PHCT, RBCH, PLT, MCV, HGBEXT, HCTEXT, PLTEXT   Lab Results   Component Value Date/Time    Sodium 143 04/10/2019 12:19 PM    Potassium 4.6 04/10/2019 12:19 PM    Chloride 104 04/10/2019 12:19 PM    CO2 24 04/10/2019 12:19 PM    Glucose 110 (H) 04/10/2019 12:19 PM    BUN 31 (H) 04/10/2019 12:19 PM    Creatinine 1.54 (H) 04/10/2019 12:19 PM    BUN/Creatinine ratio 20 04/10/2019 12:19 PM    GFR est AA 41 (L) 04/10/2019 12:19 PM    GFR est non-AA 36 (L) 04/10/2019 12:19 PM    Calcium 10.5 (H) 04/10/2019 12:19 PM    Bilirubin, total <0.2 04/10/2019 12:19 PM    AST (SGOT) 19 04/10/2019 12:19 PM    Alk. phosphatase 92 04/10/2019 12:19 PM    Protein, total 7.2 04/10/2019 12:19 PM    Albumin 4.3 04/10/2019 12:19 PM    A-G Ratio 1.5 04/10/2019 12:19 PM    ALT (SGPT) 28 04/10/2019 12:19 PM           ASSESSMENT      1. Cardiomyopathy                        A. Non-ischemic                        N. NYHA class 2  2. CAD                        R. Native  3. Percutaneous coronary transluminal angioplasty   4. ICD                        [address]                        B. Single chamber--> CRTD                        C. Primary prevention  5.  Atrial fibrillation                        A.  Inappropriate ICD shock             PLAN     Given her lung disease and oxygen dependency, suspect that she would not be a good candidate for AF ablation or long term amiodarone use. Discussed AV anna ablation with the patient and her  and they are agreeable to this plan.      Case discussed with and plan formulated with  [facility]. FOLLOW-UP   Post procedure. Thank you, Funmilayo Munoz NP and Dr. Mariajose Sullivan for allowing me to participate in the care of this extraordinarily pleasant female. Please do not hesitate to contact me for further questions/concerns.      Lidya Ruiz NP

## 2019-05-02 ENCOUNTER — TELEPHONE (OUTPATIENT)
Dept: CARDIOLOGY CLINIC | Age: 64
End: 2019-05-02

## 2019-05-07 PROBLEM — I48.91 A-FIB (HCC): Status: ACTIVE | Noted: 2019-05-07

## 2019-05-08 ENCOUNTER — TELEPHONE (OUTPATIENT)
Dept: CARDIOLOGY CLINIC | Age: 64
End: 2019-05-08

## 2019-05-08 NOTE — TELEPHONE ENCOUNTER
Pt's nurse called asking if her  can go with her for her ablation on June 20th.   Phone #[phone]  Thanks

## 2019-05-08 NOTE — TELEPHONE ENCOUNTER
Returned call, patient ID verified using two patient identifiers. Advised patient's nurse that Mr. Chari Moreno will be able to stay overnight with the patient after  her procedure.

## 2019-05-24 ENCOUNTER — TELEPHONE (OUTPATIENT)
Dept: CARDIOLOGY CLINIC | Age: 64
End: 2019-05-24

## 2019-05-24 NOTE — TELEPHONE ENCOUNTER
Patient calling to ask if \"Dr. Angel Herrmann would put her on disability? \"    She can be reached at [phone]

## 2019-05-28 NOTE — TELEPHONE ENCOUNTER
Returned call, no answer. Left message to have patient return call to [phone]. Per GABRIELLA Colin NP \"I would advise her to seek this through her primary care or lung specialist ( whoever prescribes her oxygen). \"

## 2019-05-29 NOTE — TELEPHONE ENCOUNTER
Returned call, no answer. Left message to have patient return call to [phone]. Per GABRIELLA Staley NP \"I would advise her to seek this through her primary care or lung specialist ( whoever prescribes her oxygen). \"

## 2019-05-30 NOTE — TELEPHONE ENCOUNTER
Received call from patient, ID verified using two patient identifiers. Advised patient that Dr. Li Regalado recommends that she speak with her PCP or lung specialist about putting her on disability. Patient verbalizes understanding of all information.

## 2019-06-05 ENCOUNTER — TELEPHONE (OUTPATIENT)
Dept: ENDOCRINOLOGY | Age: 64
End: 2019-06-05

## 2019-06-05 NOTE — TELEPHONE ENCOUNTER
Pt called stating she has been on steroids for one month and BG has been increasing. She states it has gone as highg as 590. When asked pt states she is taking Lantus 60 units and novolog 10-15 units before meals. Informed pt that instructions are to take Novolog 25 units before meals if on steroids. Pt verbalized understanding.

## 2019-06-06 ENCOUNTER — TELEPHONE (OUTPATIENT)
Dept: ENDOCRINOLOGY | Age: 64
End: 2019-06-06

## 2019-06-06 NOTE — TELEPHONE ENCOUNTER
Pt stated she has been on steroids ever since she was in the hospital last month or the month before. She stated she's taking a lot and can't give me the dose. Provided her with Dr. Felicitas Dickye instructions. Pt will call back to be worked in next week if sugars stay high.

## 2019-06-06 NOTE — TELEPHONE ENCOUNTER
Pt called stating BG this AM was 525. She states she started taking 25 units of Novolog at meals times yesterday. She is concerned that her BG is still high and is asking should her insulin be increased. Explained to pt that it has only been one day and insulin will take some time to work. Informed pt physician will be notified and someone will contact her with what to do. Pt verbalized understanding.

## 2019-06-06 NOTE — TELEPHONE ENCOUNTER
What does of steroids is she on and how long will she be on     Steroids makes the sugar run very high , it is very difficult to control in patients who are on insulin.      Lantus 75 units and continue meal time insulin 25 units before meals while on steroids  Once she comes of steroids she goes back to her old regimen     If sugars are still high to follow up next week

## 2019-06-12 ENCOUNTER — TELEPHONE (OUTPATIENT)
Dept: CARDIOLOGY CLINIC | Age: 64
End: 2019-06-12

## 2019-06-12 NOTE — TELEPHONE ENCOUNTER
Spoke with patient's spouse. Stated patient had been recently hospitalized for elevated blood sugar readings. Stated readings were up to the 700's. Will request hospital records for review. Scheduled for AV Node Ablation on 6/20. Please advise if procedure should be postponed.

## 2019-06-12 NOTE — TELEPHONE ENCOUNTER
Radha Ko, Patient's nurse aide, is calling to inform that Patient's blood sugars have been elevated and they are wondering if she would still be able to get that procedure done on 6/20/19 with Dr. Quin Tariq if her blood sugar does not go down.      Phone: [phone]

## 2019-06-13 ENCOUNTER — TELEPHONE (OUTPATIENT)
Dept: ENDOCRINOLOGY | Age: 64
End: 2019-06-13

## 2019-06-13 RX ORDER — SODIUM CHLORIDE 0.9 % (FLUSH) 0.9 %
5-40 SYRINGE (ML) INJECTION AS NEEDED
Status: CANCELLED | OUTPATIENT
Start: 2019-06-13

## 2019-06-13 RX ORDER — SODIUM CHLORIDE 0.9 % (FLUSH) 0.9 %
5-40 SYRINGE (ML) INJECTION EVERY 8 HOURS
Status: CANCELLED | OUTPATIENT
Start: 2019-06-13

## 2019-06-13 NOTE — TELEPHONE ENCOUNTER
Patient needs to discuss high blood sugars. Please call nursing assistant.  Just got out of hospital.

## 2019-06-13 NOTE — TELEPHONE ENCOUNTER
Pt's aid Mary Jo Moise stated pt's BG is >600. Pt was in the hospital Sat for  and discharged Mon. Her Lantus was changed to 60 units in the AM and 40 units in the PM. She states pt is taking Novolog 25 units but her BG has still been running high and is not coming down with the insulin. Asked if pt would be able to come in. She is not as she requires Medicaid transportation. Call transferred to Dr. Candace Stoner. Pt was advised that if she cannot come into to office she has to go back to the hospital. Pt also advised to make a f/u appt and to make sure she brings all her meds to her appt with her.

## 2019-06-14 ENCOUNTER — OFFICE VISIT (OUTPATIENT)
Dept: ENDOCRINOLOGY | Age: 64
End: 2019-06-14

## 2019-06-14 VITALS
WEIGHT: 206 LBS | BODY MASS INDEX: 35.17 KG/M2 | TEMPERATURE: 97.2 F | SYSTOLIC BLOOD PRESSURE: 102 MMHG | HEART RATE: 78 BPM | OXYGEN SATURATION: 95 % | DIASTOLIC BLOOD PRESSURE: 52 MMHG | RESPIRATION RATE: 16 BRPM | HEIGHT: 64 IN

## 2019-06-14 DIAGNOSIS — I10 ESSENTIAL HYPERTENSION: ICD-10-CM

## 2019-06-14 DIAGNOSIS — E11.40 TYPE 2 DIABETES MELLITUS WITH DIABETIC NEUROPATHY, WITH LONG-TERM CURRENT USE OF INSULIN (HCC): Primary | ICD-10-CM

## 2019-06-14 DIAGNOSIS — R73.9 HYPERGLYCEMIA: ICD-10-CM

## 2019-06-14 DIAGNOSIS — Z79.4 TYPE 2 DIABETES MELLITUS WITH DIABETIC NEUROPATHY, WITH LONG-TERM CURRENT USE OF INSULIN (HCC): Primary | ICD-10-CM

## 2019-06-14 DIAGNOSIS — E78.2 MIXED HYPERLIPIDEMIA: ICD-10-CM

## 2019-06-14 RX ORDER — LISINOPRIL 5 MG/1
5 TABLET ORAL DAILY
COMMUNITY

## 2019-06-14 RX ORDER — BENZONATATE 100 MG/1
100 CAPSULE ORAL
Status: ON HOLD | COMMUNITY
End: 2019-08-12 | Stop reason: CLARIF

## 2019-06-14 RX ORDER — ISOSORBIDE MONONITRATE 30 MG/1
30 TABLET, EXTENDED RELEASE ORAL DAILY
COMMUNITY

## 2019-06-14 NOTE — PROGRESS NOTES
Komal Momin is a 59 y.o. female here for   Chief Complaint   Patient presents with    Diabetes       Functional glucose monitor and record keeping system? - yes  Eye exam within last year? - on file  Foot exam within last year? - on file    1. Have you been to the ER, urgent care clinic since your last visit? Hospitalized since your last visit? -[facility] Saturday- Mon for high BG    2. Have you seen or consulted any other health care providers outside of the [address] [place] [address] since your last visit?   Include any pap smears or colon screening.-

## 2019-06-14 NOTE — LETTER
6/15/19 Patient: Gabby Kwok YOB: 1955 Date of Visit: 6/14/2019 Gloria Phipps NP 
[facility] [address],[place] [id number] VIA Facsimile: [phone] Dear Gloria Phipps NP, Thank you for referring Ms. Berkeley Alpers to [facility] for evaluation. My notes for this consultation are attached. If you have questions, please do not hesitate to call me. I look forward to following your patient along with you. Sincerely, Chelsi Merino MD

## 2019-06-14 NOTE — PROGRESS NOTES
Pt injected 25 units of Humalog U-200 and was asked to wait an hour to be rechecked. Pt sent to [facility]. BG after an hour: 377    Pt asked to take 30 units of Humalog. Pt ate a half of sandwich after taking insulin,    BG after an hour: 375    Before pt left appt, BG was 317. Pt educated on how to use insulin pen correctly before she left.

## 2019-06-14 NOTE — PATIENT INSTRUCTIONS
Check blood sugars before meals   Avoid juices and sodas ( even diet )   Reduce starchy food        Januvia 50 mg in AM        Basaglar or LAntus or  Novolin N cloudy insulin ( slow acting  insulin ) 80 units between 7 - 8 PM     Novolog or Humalog ( fast acting )  before meals         150 - 200 - 25 units       201 - 250 - 30 units       251 - 300 - 35 units       301 - 350  - 40 units       351- 400  - 45 units      >401        - 50 units         Novolog or Humalog for high sugars if not eating        150 - 200 - 5 units      201 - 250 - 10 units      251 - 300 - 15 units      301 - 350 - 20 units      351- 400  - 25 units     >401 or High -  30 units

## 2019-06-14 NOTE — PROGRESS NOTES
Mendez Morris MD    Patient Information  Date:6/14/2019  Name : Daniela Mccoy 59 y.o.     YOB: 1955         Referred by: self referred        Chief Complaint   Patient presents with    Diabetes       History of Present Illness: Daniela Mccoy is a 59 y.o. female here for follow-up of  Type 2 Diabetes Mellitus. She has a longstanding history of type 2 diabetes mellitus. Diet is not healthy     She goes on steroids frequently for bronchitis, the last 1 month she was on prednisone 40 mg, went to hospital twice for her hyperglycemia  Recently came off prednisone, she is checking blood glucose 4 times daily they are ranging from 300-400  Polyuria, polydipsia  She was hypotensive when she was in the hospital, improved with hydration  Point-of-care blood glucose was not recordable meaning it was more than 450  Review of the insulin technique while patient was getting the insulin herself, she would inject and dialed the knob down while injecting, this was corrected. Here with the aid  Insulin decreased to 360, she was given another 30 units of Humalog when she ate lunch in the office  Patient was monitored, blood pressure improved after hydration  Admits to drinking sodas, on a high carbohydrate diet  Metformin was discontinued due to CKD      She has  history of several myocardial infarctions, congestive heart failure. Her cardiologist is Dr. Mark Sun. She has some memory issues, evaluated by psychologist as well as neurology.           Wt Readings from Last 3 Encounters:   06/14/19 206 lb (93.4 kg)   05/01/19 218 lb (98.9 kg)   04/10/19 220 lb (99.8 kg)       BP Readings from Last 3 Encounters:   06/14/19 (!) 88/50   05/01/19 110/66   04/10/19 113/66           Past Medical History:   Diagnosis Date    Anxiety     Asthma     Asthma     Carpal tunnel syndrome on both sides     Chronic mental illness     Coronary artery disease     Depression     Fracture     right ankle    GERD (gastroesophageal reflux disease)     Hypertension     Memory disorder     PUD (peptic ulcer disease)     Type II or unspecified type diabetes mellitus without mention of complication, uncontrolled     Vertigo      Current Outpatient Medications   Medication Sig    benzonatate (TESSALON) 100 mg capsule Take 100 mg by mouth three (3) times daily as needed for Cough.  isosorbide mononitrate ER (IMDUR) 30 mg tablet Take  by mouth daily.  lisinopril (PRINIVIL, ZESTRIL) 5 mg tablet Take  by mouth daily.  vitamin E/quinine sulfate (LEG CRAMP TREATMENT PO) Take  by mouth.  aspirin/caffeine (TORSTEN BACK AND BODY PO) Take  by mouth.  amiodarone (CORDARONE) 200 mg tablet     insulin glargine (LANTUS SOLOSTAR U-100 INSULIN) 100 unit/mL (3 mL) inpn Inject 60 units between 7 - 8 PM . Stop Basaglar (Patient taking differently: Inject 60 units in the am and 40 units QHS)    insulin aspart U-100 (NOVOLOG FLEXPEN U-100 INSULIN) 100 unit/mL inpn INJECT 10 -  15 units before breakfast , lunch and dinner  (STOP NOVOLIN R AND APIDRA)    carvedilol (COREG) 12.5 mg tablet Take 1 Tab by mouth two (2) times daily (with meals).  Insulin Needles, Disposable, 32 gauge x 5/32\" ndle USE TWICE DAILY    melatonin 5 mg cap capsule Take 5 mg by mouth nightly.  apixaban (ELIQUIS) 5 mg tablet Take 1 Tab by mouth two (2) times a day.  SITagliptin (JANUVIA) 50 mg tablet TAKE ONE TABLET BY MOUTH IN THE MORNING    tiotropium (SPIRIVA WITH HANDIHALER) 18 mcg inhalation capsule Take 1 Cap by inhalation daily.  sertraline (ZOLOFT) 100 mg tablet Take 200 mg by mouth daily.  aspirin delayed-release 81 mg tablet Take  by mouth daily.  TRUEPLUS LANCETS 33 gauge misc     albuterol (PROAIR HFA) 90 mcg/actuation inhaler Take  by inhalation.  amLODIPine (NORVASC) 5 mg tablet Take 5 mg by mouth daily.     Lancets misc Test blood glucose 4 times daily    buPROPion XL (WELLBUTRIN XL) 300 mg XL tablet Take 300 mg by mouth every morning.  calcium-cholecalciferol, D3, (CALCARB 600 WITH VITAMIN D) tablet Take 1 Tab by mouth daily.  nitroglycerin (NITROSTAT) 0.4 mg SL tablet by SubLINGual route every five (5) minutes as needed for Chest Pain.  loratadine 10 mg cap Take  by mouth.  famotidine (PEPCID) 40 mg tablet Take 40 mg by mouth two (2) times a day.  omeprazole (PRILOSEC) 20 mg capsule Take 20 mg by mouth daily.  furosemide (LASIX) 40 mg tablet Take  by mouth daily.  montelukast (SINGULAIR) 10 mg tablet Take 10 mg by mouth daily.  promethazine (PHENERGAN) 6.25 mg/5 mL syrup     PROLIA 60 mg/mL injection     atorvastatin (LIPITOR) 40 mg tablet Take  by mouth daily.  guaiFENesin-dextromethorphan SR (MUCINEX DM) 600-30 mg per tablet Take 1 Tab by mouth two (2) times a day.  FERROUS FUMARATE (IRON PO) Take 27 mg by mouth daily.  meclizine (ANTIVERT) 25 mg tablet Take 1 tablet by mouth three (3) times daily as needed. No current facility-administered medications for this visit. Allergies   Allergen Reactions    Beef Derived (Bovine) Hives    Shellfish Derived Hives         Review of Systems:  -   - Eyes: no blurry vision no double vision  - Cardiovascular: no chest pain ,no palpitations  - Respiratory: no cough no shortness of breath  - Gastrointestinal: no dysphagia no  abdominal pain  -   -     Physical Examination:   Blood pressure (!) 88/50, pulse 79, temperature 97.2 °F (36.2 °C), temperature source Oral, resp. rate 16, height 5' 4\" (1.626 m), weight 206 lb (93.4 kg), SpO2 95 %. Estimated body mass index is 35.36 kg/m² as calculated from the following:    Height as of this encounter: 5' 4\" (1.626 m). -   Weight as of this encounter: 206 lb (93.4 kg).   - General: pleasant, no distress, good eye contact  - HEENT: no pallor, no periorbital edema, EOMI  - Neck: supple, no thyromegaly  - Cardiovascular: regular, normal rate, normal S1 and S2,  - Respiratory: clear to auscultation bilaterally  - Gastrointestinal: soft, nontender, nondistended,  BS +  -   - Psychiatric: normal mood and affect  - Skin: color, texture, turgor normal.     Diabetic foot exam: April 2019    Left:     Vibratory sensation absent   Filament test decreased sensation with micro filament   Pulse DP: 1+    Deformities: Callus  Right:    Vibratory sensation absent   Filament test decreased sensation with micro filament   Pulse DP: 1+   Deformities: None    Data Reviewed:     Lab Results   Component Value Date/Time    Hemoglobin A1c 8.2 (H) 04/10/2019 12:19 PM    Hemoglobin A1c 10.7 (H) 01/03/2018 09:49 AM    Hemoglobin A1c 8.9 (H) 03/30/2016 10:07 AM    Hemoglobin A1c, External 9.8 08/09/2017    Hemoglobin A1c, External 7.7 03/23/2017    Hemoglobin A1c, External 7.7 03/13/2017    Glucose 110 (H) 04/10/2019 12:19 PM    Glucose  11/03/2016 10:14 AM    Microalb/Creat ratio (ug/mg creat.) 26.9 04/10/2019 12:19 PM    LDL,Direct 102 (H) 04/28/2015 04:53 PM    LDL, calculated 59 01/03/2018 09:49 AM    Creatinine 1.54 (H) 04/10/2019 12:19 PM      Lab Results   Component Value Date/Time    GFR est non-AA 36 (L) 04/10/2019 12:19 PM    GFR est AA 41 (L) 04/10/2019 12:19 PM    Creatinine 1.54 (H) 04/10/2019 12:19 PM    BUN 31 (H) 04/10/2019 12:19 PM    Sodium 143 04/10/2019 12:19 PM    Potassium 4.6 04/10/2019 12:19 PM    Chloride 104 04/10/2019 12:19 PM    CO2 24 04/10/2019 12:19 PM    Phosphorus 2.9 12/17/2014 02:53 PM    PTH, Intact 27 12/17/2014 02:53 PM             Assessment/Plan:     1. Type 2 diabetes mellitus with diabetic neuropathy, with long-term current use of insulin ([address]    2. Essential hypertension    3. Mixed hyperlipidemia        1.  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 Diet not healthy: Stressed the importance of decreasing the carbohydrate portion, worsening insulin resistance  Januvia 50 mg  Very high risk for decompensation, call in 2 weeks if she has still hyperglycemia    Discussed the importance of checking home glucose regularly and taking all of their scheduled medications in order to have the best possible outcome. Reviewed the complications and importance of diet. Maintain the blood glucose log , with insulin doses - d/w aid again     Advised to check glucose 4 times daily  FLU annually ,Pneumovax ,aspirin daily,annual eye exam,microalbumin    2. HTN : Continue current therapy     3. Hyperlipidemia : Continue statin. 4. CKD  -    5 . Obesity Body mass index is 35.36 kg/m². 7 CAD/CHF - Dr Belgica Ramirez, EF 20%    She was asked to come back next week    There are no Patient Instructions on file for this visit. Patient verbalized understanding      Thank you for allowing me to participate in the care of this patient.     Eli Bond MD

## 2019-06-17 NOTE — PROGRESS NOTES
Thelma Anderson is a 59 y.o. female here for   Chief Complaint   Patient presents with    Diabetes     Had to call rescue on Friday for BG dropping too low in 50's  Surgery cancelled for Friday 6/21/19    Functional glucose monitor and record keeping system? - yes  Eye exam within last year? - on file, due soon  Foot exam within last year? - on file    1. Have you been to the ER, urgent care clinic since your last visit? Hospitalized since your last visit? -no    2. Have you seen or consulted any other health care providers outside of the [address] [place] [address] since your last visit? Include any pap smears or colon screening. -PCP

## 2019-06-17 NOTE — TELEPHONE ENCOUNTER
Called patient to discuss recent blood sugar readings. Please review notes from office visit on 6/14 Dr. Warren Phan, endocrinology. Readings in the 300's. Please advise if rescheduling is recommended of AV Node ablation on 6/20. Thanks.

## 2019-06-18 ENCOUNTER — OFFICE VISIT (OUTPATIENT)
Dept: ENDOCRINOLOGY | Age: 64
End: 2019-06-18

## 2019-06-18 VITALS
DIASTOLIC BLOOD PRESSURE: 62 MMHG | HEIGHT: 64 IN | BODY MASS INDEX: 34.66 KG/M2 | RESPIRATION RATE: 16 BRPM | HEART RATE: 86 BPM | TEMPERATURE: 97.3 F | WEIGHT: 203 LBS | OXYGEN SATURATION: 93 % | SYSTOLIC BLOOD PRESSURE: 110 MMHG

## 2019-06-18 DIAGNOSIS — E78.2 MIXED HYPERLIPIDEMIA: ICD-10-CM

## 2019-06-18 DIAGNOSIS — I10 ESSENTIAL HYPERTENSION: ICD-10-CM

## 2019-06-18 DIAGNOSIS — E11.40 TYPE 2 DIABETES MELLITUS WITH DIABETIC NEUROPATHY, WITH LONG-TERM CURRENT USE OF INSULIN (HCC): Primary | ICD-10-CM

## 2019-06-18 DIAGNOSIS — Z79.4 TYPE 2 DIABETES MELLITUS WITH DIABETIC NEUROPATHY, WITH LONG-TERM CURRENT USE OF INSULIN (HCC): Primary | ICD-10-CM

## 2019-06-18 NOTE — PROGRESS NOTES
Brandi Orosco MD    Patient Information  Date:6/18/2019  Name : Laine Jasmine 59 y.o.     YOB: 1955         Referred by: self referred        Chief Complaint   Patient presents with    Diabetes       History of Present Illness: Laine Jasmine is a 59 y.o. female here for follow-up of  Type 2 Diabetes Mellitus. She was seen last week for severe hyperglycemia related to glucocorticoids  She is off glucocorticoids  Adjusted the insulin, has an aide and a friend with her  Saturday morning reportedly had severe hypoglycemia, paramedics were called but she did not go to the hospital.  According to the aide and friend she is getting confused with insulin, taking 80 units of NovoLog every time she eats and the aide has caught her few times, instruction sheet was given to the patient at last visit but she is not able to follow it.  works. Review of the log blood glucose is fluctuating widely. She is on a high carb diet still      She has  history of several myocardial infarctions, congestive heart failure. Her cardiologist is Dr. Corrina Gleason. She has some memory issues, evaluated by psychologist as well as neurology.           Wt Readings from Last 3 Encounters:   06/18/19 203 lb (92.1 kg)   06/14/19 206 lb (93.4 kg)   05/01/19 218 lb (98.9 kg)       BP Readings from Last 3 Encounters:   06/18/19 110/62   06/14/19 102/52   05/01/19 110/66           Past Medical History:   Diagnosis Date    Anxiety     Asthma     Asthma     Carpal tunnel syndrome on both sides     Chronic mental illness     Coronary artery disease     Depression     Fracture     right ankle    GERD (gastroesophageal reflux disease)     Hypertension     Memory disorder     PUD (peptic ulcer disease)     Type II or unspecified type diabetes mellitus without mention of complication, uncontrolled     Vertigo      Current Outpatient Medications   Medication Sig    benzonatate (TESSALON) 100 mg capsule Take 100 mg by mouth three (3) times daily as needed for Cough.  isosorbide mononitrate ER (IMDUR) 30 mg tablet Take  by mouth daily.  lisinopril (PRINIVIL, ZESTRIL) 5 mg tablet Take  by mouth daily.  vitamin E/quinine sulfate (LEG CRAMP TREATMENT PO) Take  by mouth.  aspirin/caffeine (TORSTEN BACK AND BODY PO) Take  by mouth.  insulin lispro (HUMALOG KWIKPEN INSULIN) 200 unit/mL (3 mL) inpn Use as directed dispense as sample per Dr Cruz [place]    amiodarone (CORDARONE) 200 mg tablet     insulin glargine (LANTUS SOLOSTAR U-100 INSULIN) 100 unit/mL (3 mL) inpn Inject 60 units between 7 - 8 PM . Stop Basaglar (Patient taking differently: Inject 80 units in the am)    insulin aspart U-100 (NOVOLOG FLEXPEN U-100 INSULIN) 100 unit/mL inpn INJECT 10 -  15 units before breakfast , lunch and dinner  (STOP NOVOLIN R AND APIDRA) (Patient taking differently: INJECT 25 units before breakfast , lunch and dinner  (STOP NOVOLIN R AND APIDRA))    carvedilol (COREG) 12.5 mg tablet Take 1 Tab by mouth two (2) times daily (with meals).  Insulin Needles, Disposable, 32 gauge x 5/32\" ndle USE TWICE DAILY    melatonin 5 mg cap capsule Take 5 mg by mouth nightly.  apixaban (ELIQUIS) 5 mg tablet Take 1 Tab by mouth two (2) times a day.  furosemide (LASIX) 40 mg tablet Take  by mouth daily.  SITagliptin (JANUVIA) 50 mg tablet TAKE ONE TABLET BY MOUTH IN THE MORNING    promethazine (PHENERGAN) 6.25 mg/5 mL syrup     tiotropium (SPIRIVA WITH HANDIHALER) 18 mcg inhalation capsule Take 1 Cap by inhalation daily.  atorvastatin (LIPITOR) 40 mg tablet Take  by mouth daily.  guaiFENesin-dextromethorphan SR (MUCINEX DM) 600-30 mg per tablet Take 1 Tab by mouth two (2) times a day.  sertraline (ZOLOFT) 100 mg tablet Take 200 mg by mouth daily.  aspirin delayed-release 81 mg tablet Take  by mouth daily.     TRUEPLUS LANCETS 33 gauge misc     FERROUS FUMARATE (IRON PO) Take 27 mg by mouth daily.  albuterol (PROAIR HFA) 90 mcg/actuation inhaler Take  by inhalation.  amLODIPine (NORVASC) 5 mg tablet Take 5 mg by mouth daily.  Lancets misc Test blood glucose 4 times daily    buPROPion XL (WELLBUTRIN XL) 300 mg XL tablet Take 300 mg by mouth every morning.  calcium-cholecalciferol, D3, (CALCARB 600 WITH VITAMIN D) tablet Take 1 Tab by mouth daily.  nitroglycerin (NITROSTAT) 0.4 mg SL tablet by SubLINGual route every five (5) minutes as needed for Chest Pain.  loratadine 10 mg cap Take  by mouth.  famotidine (PEPCID) 40 mg tablet Take 40 mg by mouth two (2) times a day.  omeprazole (PRILOSEC) 20 mg capsule Take 20 mg by mouth daily.  montelukast (SINGULAIR) 10 mg tablet Take 10 mg by mouth daily.  PROLIA 60 mg/mL injection     meclizine (ANTIVERT) 25 mg tablet Take 1 tablet by mouth three (3) times daily as needed. No current facility-administered medications for this visit. Allergies   Allergen Reactions    Beef Derived (Bovine) Hives    Shellfish Derived Hives         Review of Systems:  -   - Eyes: no blurry vision no double vision  - Cardiovascular: no chest pain ,no palpitations  - Respiratory: no cough no shortness of breath  - Gastrointestinal: no dysphagia no  abdominal pain  -   -     Physical Examination:   Blood pressure 110/62, pulse 86, temperature 97.3 °F (36.3 °C), temperature source Oral, resp. rate 16, height 5' 4\" (1.626 m), weight 203 lb (92.1 kg), SpO2 93 %. Estimated body mass index is 34.84 kg/m² as calculated from the following:    Height as of this encounter: 5' 4\" (1.626 m). -   Weight as of this encounter: 203 lb (92.1 kg).   - General: pleasant, no distress, good eye contact  - HEENT: no pallor, no periorbital edema, EOMI  - Neck: supple,   -   -   - Psychiatric: normal mood and affect  - Skin: color, texture, turgor normal.     Diabetic foot exam: April 2019    Left:     Vibratory sensation absent   Filament test decreased sensation with micro filament   Pulse DP: 1+    Deformities: Callus  Right:    Vibratory sensation absent   Filament test decreased sensation with micro filament   Pulse DP: 1+   Deformities: None    Data Reviewed:     Lab Results   Component Value Date/Time    Hemoglobin A1c 8.2 (H) 04/10/2019 12:19 PM    Hemoglobin A1c 10.7 (H) 01/03/2018 09:49 AM    Hemoglobin A1c 8.9 (H) 03/30/2016 10:07 AM    Hemoglobin A1c, External 9.8 08/09/2017    Hemoglobin A1c, External 7.7 03/23/2017    Hemoglobin A1c, External 7.7 03/13/2017    Glucose 110 (H) 04/10/2019 12:19 PM    Glucose  11/03/2016 10:14 AM    Microalb/Creat ratio (ug/mg creat.) 26.9 04/10/2019 12:19 PM    LDL,Direct 102 (H) 04/28/2015 04:53 PM    LDL, calculated 59 01/03/2018 09:49 AM    Creatinine 1.54 (H) 04/10/2019 12:19 PM      Lab Results   Component Value Date/Time    GFR est non-AA 36 (L) 04/10/2019 12:19 PM    GFR est AA 41 (L) 04/10/2019 12:19 PM    Creatinine 1.54 (H) 04/10/2019 12:19 PM    BUN 31 (H) 04/10/2019 12:19 PM    Sodium 143 04/10/2019 12:19 PM    Potassium 4.6 04/10/2019 12:19 PM    Chloride 104 04/10/2019 12:19 PM    CO2 24 04/10/2019 12:19 PM    Phosphorus 2.9 12/17/2014 02:53 PM    PTH, Intact 27 12/17/2014 02:53 PM             Assessment/Plan:     1. Type 2 diabetes mellitus with diabetic neuropathy, with long-term current use of insulin ([address]    2. Essential hypertension    3. Mixed hyperlipidemia        1. 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 Sandra Lemus should avoid handling insulin completely, friend will inject insulin, and if the friend is not there aid to monitor the exact dose based on the instruction sheet  Instruction given to all of them, log sheet so that we can document the amount of insulin she is taking  Has been to help the patient      Diet not healthy: Stressed the importance of decreasing the carbohydrate portion, worsening insulin resistance  Januvia 50 mg  Very high risk for decompensation, call in 2 weeks if she has still hyperglycemia    Discussed the importance of checking home glucose regularly and taking all of their scheduled medications in order to have the best possible outcome. Reviewed the complications and importance of diet. Maintain the blood glucose log , with insulin doses - d/w aid again     Advised to check glucose 4 times daily  FLU annually ,Pneumovax ,aspirin daily,annual eye exam,microalbumin    2. HTN : Continue current therapy     3. Hyperlipidemia : Continue statin. 4. CKD  -    5 . Obesity Body mass index is 34.84 kg/m².           7 CAD/CHF - Dr Lorri Jackson, EF 20%    She was asked to come back next week    Patient Instructions   Check blood sugars before meals   Avoid juices and sodas ( even diet )   Reduce starchy food        Januvia 50 mg in AM       No insulin if sugars are less than 100     Basaglar or LAntus or  Novolin N cloudy insulin ( slow acting  insulin ) 80 units between 7 - 8 PM     Novolog or Humalog ( fast acting )  before meals       100- 150 - 20 units      151 - 200 - 25 units       201 - 250 - 30 units       251 - 300 - 35 units       301 - 350  - 40 units       351- 400  - 45 units      >401        - 50 units         Novolog or Humalog for high sugars if not eating        150 - 200 - 5 units      201 - 250 - 10 units      251 - 300 - 15 units      301 - 350 - 20 units      351- 400  - 25 units     >401 or High -  30 units                                                               Patient verbalized understanding  Follow-up and Dispositions    · Return in about 2 weeks (around 7/5/2019). Thank you for allowing me to participate in the care of this patient.     Nathalie Johnson MD

## 2019-06-18 NOTE — PATIENT INSTRUCTIONS
Check blood sugars before meals   Avoid juices and sodas ( even diet )   Reduce starchy food        Januvia 50 mg in AM       No insulin if sugars are less than 100     Basaglar or LAntus or  Novolin N cloudy insulin ( slow acting  insulin ) 80 units between 7 - 8 PM     Novolog or Humalog ( fast acting )  before meals       100- 150 - 20 units      151 - 200 - 25 units       201 - 250 - 30 units       251 - 300 - 35 units       301 - 350  - 40 units       351- 400  - 45 units      >401        - 50 units         Novolog or Humalog for high sugars if not eating        150 - 200 - 5 units      201 - 250 - 10 units      251 - 300 - 15 units      301 - 350 - 20 units      351- 400  - 25 units     >401 or High -  30 units

## 2019-06-18 NOTE — TELEPHONE ENCOUNTER
Spoke with patient's daytime caretaker. Stated Mrs. Raman's blood pressure have been low and blood sugar's continue to be elevated. On the way for a follow up with Dr. Anthony Wiley, also seen on 6/14. Recommended postponing AV Node Ablation pending improvement in blood sugar and pressure. Will cancel.

## 2019-06-18 NOTE — LETTER
6/18/19 Patient: Rosa Maria Tabares YOB: 1955 Date of Visit: 6/18/2019 Manuel Bernal NP 
[facility] [address] [address] VIA Facsimile: [phone] Dear Manuel Bernal NP, Thank you for referring Ms. Breanna Antonio to [facility] for evaluation. My notes for this consultation are attached. If you have questions, please do not hesitate to call me. I look forward to following your patient along with you. Sincerely, Kenia Hodge MD

## 2019-06-27 ENCOUNTER — DOCUMENTATION ONLY (OUTPATIENT)
Dept: CARDIOLOGY CLINIC | Age: 64
End: 2019-06-27

## 2019-06-27 NOTE — PROGRESS NOTES
Medical records request faxed to [facility]. Requested by Mehrdad Sesay for disability and continuum of care. Requested progress/office notes and cardiac testing reports.       [facility]  Phone: [phone]  Fax: [phone]

## 2019-07-05 ENCOUNTER — OFFICE VISIT (OUTPATIENT)
Dept: ENDOCRINOLOGY | Age: 64
End: 2019-07-05

## 2019-07-05 VITALS
TEMPERATURE: 97.4 F | HEART RATE: 79 BPM | RESPIRATION RATE: 18 BRPM | DIASTOLIC BLOOD PRESSURE: 52 MMHG | WEIGHT: 206 LBS | HEIGHT: 64 IN | SYSTOLIC BLOOD PRESSURE: 104 MMHG | OXYGEN SATURATION: 96 % | BODY MASS INDEX: 35.17 KG/M2

## 2019-07-05 DIAGNOSIS — Z79.4 ENCOUNTER FOR LONG-TERM (CURRENT) USE OF INSULIN (HCC): ICD-10-CM

## 2019-07-05 DIAGNOSIS — E11.40 TYPE 2 DIABETES MELLITUS WITH DIABETIC NEUROPATHY, WITH LONG-TERM CURRENT USE OF INSULIN (HCC): ICD-10-CM

## 2019-07-05 DIAGNOSIS — E11.22 TYPE 2 DIABETES MELLITUS WITH CHRONIC KIDNEY DISEASE, WITH LONG-TERM CURRENT USE OF INSULIN, UNSPECIFIED CKD STAGE (HCC): Primary | ICD-10-CM

## 2019-07-05 DIAGNOSIS — E66.01 SEVERE OBESITY (BMI 35.0-39.9) WITH COMORBIDITY (HCC): ICD-10-CM

## 2019-07-05 DIAGNOSIS — E78.2 MIXED HYPERLIPIDEMIA: ICD-10-CM

## 2019-07-05 DIAGNOSIS — I10 ESSENTIAL HYPERTENSION: ICD-10-CM

## 2019-07-05 DIAGNOSIS — Z79.4 TYPE 2 DIABETES MELLITUS WITH DIABETIC NEUROPATHY, WITH LONG-TERM CURRENT USE OF INSULIN (HCC): ICD-10-CM

## 2019-07-05 DIAGNOSIS — Z79.4 TYPE 2 DIABETES MELLITUS WITH CHRONIC KIDNEY DISEASE, WITH LONG-TERM CURRENT USE OF INSULIN, UNSPECIFIED CKD STAGE (HCC): Primary | ICD-10-CM

## 2019-07-05 RX ORDER — BUDESONIDE AND FORMOTEROL FUMARATE DIHYDRATE 160; 4.5 UG/1; UG/1
AEROSOL RESPIRATORY (INHALATION)
Refills: 0 | COMMUNITY
Start: 2019-06-29

## 2019-07-05 NOTE — PROGRESS NOTES
Teodoro Mccormick is a 59 y.o. female here for   Chief Complaint   Patient presents with    Diabetes       Functional glucose monitor and record keeping system? -yes   Eye exam within last year? - on file  Foot exam within last year? - on file    1. Have you been to the ER, urgent care clinic since your last visit? Hospitalized since your last visit? -[facility] 6/20/19 [facility]    2. Have you seen or consulted any other health care providers outside of the [address] [place] [address] since your last visit?   Include any pap smears or colon screening.-no

## 2019-07-05 NOTE — LETTER
7/5/19 Patient: Yuliet Gupta YOB: 1955 Date of Visit: 7/5/2019 Kolby Lozano NP 
[facility] [address] [address] VIA Facsimile: [phone] Dear Kolby Lozano NP, Thank you for referring Ms. Tonny Mckenzie to [facility] for evaluation. My notes for this consultation are attached. If you have questions, please do not hesitate to call me. I look forward to following your patient along with you. Sincerely, Rahat Fernandez MD

## 2019-07-05 NOTE — PROGRESS NOTES
Salma Church MD    Patient Information  Date:7/5/2019  Name : Liana Leyva 59 y.o.     YOB: 1955         Referred by: self referred        Chief Complaint   Patient presents with    Diabetes       History of Present Illness: Liana Leyva is a 59 y.o. female here for follow-up of  Type 2 Diabetes Mellitus. She is off glucocorticoids  Adjusted the insulin, has an aide, no family member, aide is different this time    She was getting confused with insulin, taking insulin in appropriately. Now  is managing, blood glucose has improved significantly. No hypoglycemia  She has cut down the sodas      She has  history of several myocardial infarctions, congestive heart failure. Her cardiologist is Dr. Nadege Don. She has some memory issues, evaluated by psychologist as well as neurology. Wt Readings from Last 3 Encounters:   07/05/19 206 lb (93.4 kg)   06/18/19 203 lb (92.1 kg)   06/14/19 206 lb (93.4 kg)       BP Readings from Last 3 Encounters:   07/05/19 104/52   06/18/19 110/62   06/14/19 102/52           Past Medical History:   Diagnosis Date    Anxiety     Asthma     Asthma     Carpal tunnel syndrome on both sides     Chronic mental illness     Coronary artery disease     Depression     Fracture     right ankle    GERD (gastroesophageal reflux disease)     Hypertension     Memory disorder     PUD (peptic ulcer disease)     Type II or unspecified type diabetes mellitus without mention of complication, uncontrolled     Vertigo      Current Outpatient Medications   Medication Sig    SYMBICORT 160-4.5 mcg/actuation HFAA INHALE 2 PUFFS BY MOUTH TWICE DAILY    benzonatate (TESSALON) 100 mg capsule Take 100 mg by mouth three (3) times daily as needed for Cough.  isosorbide mononitrate ER (IMDUR) 30 mg tablet Take 30 mg by mouth daily.  lisinopril (PRINIVIL, ZESTRIL) 5 mg tablet Take 5 mg by mouth daily.     amiodarone (CORDARONE) 200 mg tablet Take 200 mg by mouth daily.  insulin glargine (LANTUS SOLOSTAR U-100 INSULIN) 100 unit/mL (3 mL) inpn Inject 60 units between 7 - 8 PM . Stop Basaglar (Patient taking differently: Inject 80 units in the am)    insulin aspart U-100 (NOVOLOG FLEXPEN U-100 INSULIN) 100 unit/mL inpn INJECT 10 -  15 units before breakfast , lunch and dinner  (STOP NOVOLIN R AND APIDRA) (Patient taking differently: INJECT 25 units before breakfast , lunch and dinner  (STOP NOVOLIN R AND APIDRA))    carvedilol (COREG) 12.5 mg tablet Take 1 Tab by mouth two (2) times daily (with meals).  Insulin Needles, Disposable, 32 gauge x 5/32\" ndle USE TWICE DAILY    famotidine (PEPCID) 20 mg tablet Take 20 mg by mouth two (2) times daily as needed.  melatonin 5 mg cap capsule Take 5 mg by mouth nightly.  apixaban (ELIQUIS) 5 mg tablet Take 1 Tab by mouth two (2) times a day.  montelukast (SINGULAIR) 10 mg tablet Take 10 mg by mouth daily.  SITagliptin (JANUVIA) 50 mg tablet TAKE ONE TABLET BY MOUTH IN THE MORNING    sertraline (ZOLOFT) 100 mg tablet Take 200 mg by mouth daily.  aspirin delayed-release 81 mg tablet Take 81 mg by mouth daily.  TRUEPLUS LANCETS 33 gauge misc     albuterol (PROAIR HFA) 90 mcg/actuation inhaler Take  by inhalation.  amLODIPine (NORVASC) 5 mg tablet Take 5 mg by mouth nightly.  Lancets misc Test blood glucose 4 times daily    buPROPion XL (WELLBUTRIN XL) 300 mg XL tablet Take 300 mg by mouth every morning.  calcium-cholecalciferol, D3, (CALCARB 600 WITH VITAMIN D) tablet Take 1 Tab by mouth daily.  nitroglycerin (NITROSTAT) 0.4 mg SL tablet by SubLINGual route every five (5) minutes as needed for Chest Pain.  loratadine 10 mg cap Take 10 mg by mouth daily.  vitamin E/quinine sulfate (LEG CRAMP TREATMENT PO) Take  by mouth.  aspirin/caffeine (TORSTEN BACK AND BODY PO) Take  by mouth.     insulin lispro (HUMALOG KWIKPEN INSULIN) 200 unit/mL (3 mL) inpn Use as directed dispense as sample per Dr Tasia Robertson    omeprazole (PRILOSEC) 20 mg capsule Take 20 mg by mouth daily.  furosemide (LASIX) 40 mg tablet Take  by mouth daily.  promethazine (PHENERGAN) 6.25 mg/5 mL syrup     tiotropium (SPIRIVA WITH HANDIHALER) 18 mcg inhalation capsule Take 1 Cap by inhalation daily.  PROLIA 60 mg/mL injection     atorvastatin (LIPITOR) 40 mg tablet Take  by mouth daily.  guaiFENesin-dextromethorphan SR (MUCINEX DM) 600-30 mg per tablet Take 1 Tab by mouth two (2) times a day.  FERROUS FUMARATE (IRON PO) Take 27 mg by mouth daily.  meclizine (ANTIVERT) 25 mg tablet Take 1 tablet by mouth three (3) times daily as needed. No current facility-administered medications for this visit. Allergies   Allergen Reactions    Beef Derived (Bovine) Hives    Shellfish Derived Hives         Review of Systems:  -   - Eyes: no blurry vision no double vision  - Cardiovascular: no chest pain ,no palpitations  - Respiratory: no cough no shortness of breath  - Gastrointestinal: no dysphagia no  abdominal pain  -   -     Physical Examination:   Blood pressure 104/52, pulse 79, temperature 97.4 °F (36.3 °C), temperature source Oral, resp. rate 18, height 5' 4\" (1.626 m), weight 206 lb (93.4 kg), SpO2 96 %. Estimated body mass index is 35.36 kg/m² as calculated from the following:    Height as of this encounter: 5' 4\" (1.626 m). -   Weight as of this encounter: 206 lb (93.4 kg).   - General: pleasant, no distress, good eye contact  - HEENT: no pallor, no periorbital edema, EOMI  - Neck: supple,   -   -   - Psychiatric: normal mood and affect  - Skin: color, texture, turgor normal.     Diabetic foot exam: April 2019    Left:     Vibratory sensation absent   Filament test decreased sensation with micro filament   Pulse DP: 1+    Deformities: Callus  Right:    Vibratory sensation absent   Filament test decreased sensation with micro filament   Pulse DP: 1+   Deformities: None    Data Reviewed:     Lab Results   Component Value Date/Time    Hemoglobin A1c 8.2 (H) 04/10/2019 12:19 PM    Hemoglobin A1c 10.7 (H) 01/03/2018 09:49 AM    Hemoglobin A1c 8.9 (H) 03/30/2016 10:07 AM    Hemoglobin A1c, External 9.8 08/09/2017    Hemoglobin A1c, External 7.7 03/23/2017    Hemoglobin A1c, External 7.7 03/13/2017    Glucose 110 (H) 04/10/2019 12:19 PM    Glucose  11/03/2016 10:14 AM    Microalb/Creat ratio (ug/mg creat.) 26.9 04/10/2019 12:19 PM    LDL,Direct 102 (H) 04/28/2015 04:53 PM    LDL, calculated 59 01/03/2018 09:49 AM    Creatinine 1.54 (H) 04/10/2019 12:19 PM      Lab Results   Component Value Date/Time    GFR est non-AA 36 (L) 04/10/2019 12:19 PM    GFR est AA 41 (L) 04/10/2019 12:19 PM    Creatinine 1.54 (H) 04/10/2019 12:19 PM    BUN 31 (H) 04/10/2019 12:19 PM    Sodium 143 04/10/2019 12:19 PM    Potassium 4.6 04/10/2019 12:19 PM    Chloride 104 04/10/2019 12:19 PM    CO2 24 04/10/2019 12:19 PM    Phosphorus 2.9 12/17/2014 02:53 PM    PTH, Intact 27 12/17/2014 02:53 PM             Assessment/Plan:     1. Type 2 diabetes mellitus with chronic kidney disease, with long-term current use of insulin, unspecified CKD stage ([address]    2. Essential hypertension    3. Mixed hyperlipidemia        1. 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 Discussed with the aide  she cannot handle insulin injections on her own  Ms. Harshad Toussaint should avoid handling insulin completely,   Lantus  NovoLog    Diet not healthy: Stressed the importance of decreasing the carbohydrate portion, worsening insulin resistance  Januvia 50 mg  High risk for decompensation    Discussed the importance of checking home glucose regularly and taking all of their scheduled medications in order to have the best possible outcome. Reviewed the complications and importance of diet. Maintain the blood glucose log , with insulin doses - d/w aid again     Advised to check glucose 4 times daily  FLU annually ,Pneumovax ,aspirin daily,annual eye exam,microalbumin    2. HTN : Continue current therapy     3. Hyperlipidemia : Continue statin. 4. CKD  -    5 . Obesity Body mass index is 35.36 kg/m². 7 CAD/CHF - Dr Jose Luis Champagne, EF 20%        Patient Instructions   Check blood sugars before meals   Avoid juices and sodas ( even diet )   Reduce starchy food        Januvia 50 mg in AM     No insulin if sugars are less than 100     Basaglar or LAntus or  Novolin N cloudy insulin ( slow acting  insulin ) 80 units between 7 - 8 PM     Novolog or Humalog ( fast acting )  before meals       100- 150 - 20 units      151 - 200 - 25 units       201 - 250 - 30 units       251 - 300 - 35 units       301 - 350  - 40 units       351- 400  - 45 units      >401        - 50 units         Novolog or Humalog for high sugars if not eating        150 - 200 - 5 units      201 - 250 - 10 units      251 - 300 - 15 units      301 - 350 - 20 units      351- 400  - 25 units     >401 or High -  30 units                                                               Patient verbalized understanding      Thank you for allowing me to participate in the care of this patient.     Nat Becerra MD

## 2019-07-08 ENCOUNTER — TELEPHONE (OUTPATIENT)
Dept: CARDIOLOGY CLINIC | Age: 64
End: 2019-07-08

## 2019-07-08 DIAGNOSIS — I42.9 CARDIOMYOPATHY, UNSPECIFIED TYPE (HCC): ICD-10-CM

## 2019-07-08 DIAGNOSIS — E11.65 TYPE 2 DIABETES MELLITUS WITH HYPERGLYCEMIA, UNSPECIFIED WHETHER LONG TERM INSULIN USE (HCC): ICD-10-CM

## 2019-07-08 DIAGNOSIS — Z95.810 ICD (IMPLANTABLE CARDIOVERTER-DEFIBRILLATOR) IN PLACE: ICD-10-CM

## 2019-07-08 NOTE — TELEPHONE ENCOUNTER
Patients nurse. Chelsi Coats, is inquiring about a procedure that was scheduled for 6/20 but had to be cancelled due to her blood sugar being too high. Chelsi Coats states that he blood sugar is back down and would like for you to call patients  to reschedule.     Phone: [phone] Kyrie Hdz, pt )

## 2019-07-10 NOTE — TELEPHONE ENCOUNTER
Spoke with patient's spouse.   Will reschedule AV Node ablation for Monday, August 12th, 11:00am arrival.

## 2019-07-16 DIAGNOSIS — I48.91 ATRIAL FIBRILLATION, UNSPECIFIED TYPE (HCC): ICD-10-CM

## 2019-07-16 DIAGNOSIS — Z95.810 ICD (IMPLANTABLE CARDIOVERTER-DEFIBRILLATOR) IN PLACE: ICD-10-CM

## 2019-07-16 DIAGNOSIS — I42.9 CARDIOMYOPATHY, UNSPECIFIED TYPE (HCC): Primary | ICD-10-CM

## 2019-07-19 ENCOUNTER — TELEPHONE (OUTPATIENT)
Dept: CARDIOLOGY CLINIC | Age: 64
End: 2019-07-19

## 2019-07-19 NOTE — TELEPHONE ENCOUNTER
Patient would like to ask you a couple questions about her upcoming ablation.      Phone: [phone]

## 2019-07-19 NOTE — TELEPHONE ENCOUNTER
Returned call. Spoke with patient. Patient will arrive at 10 instead of 11 to allow for labs. Unable to get transportation for labs.

## 2019-07-23 NOTE — TELEPHONE ENCOUNTER
Spoke with patient. Did not call patient earlier. No note in chart from who may have called patient.

## 2019-08-01 ENCOUNTER — OFFICE VISIT (OUTPATIENT)
Dept: CARDIOLOGY CLINIC | Age: 64
End: 2019-08-01

## 2019-08-01 DIAGNOSIS — Z95.810 CARDIAC DEFIBRILLATOR IN SITU: Primary | ICD-10-CM

## 2019-08-08 DIAGNOSIS — I10 ESSENTIAL HYPERTENSION: ICD-10-CM

## 2019-08-08 DIAGNOSIS — E78.2 MIXED HYPERLIPIDEMIA: ICD-10-CM

## 2019-08-08 DIAGNOSIS — E11.40 TYPE 2 DIABETES MELLITUS WITH DIABETIC NEUROPATHY, WITH LONG-TERM CURRENT USE OF INSULIN (HCC): ICD-10-CM

## 2019-08-08 DIAGNOSIS — Z79.4 TYPE 2 DIABETES MELLITUS WITH DIABETIC NEUROPATHY, WITH LONG-TERM CURRENT USE OF INSULIN (HCC): ICD-10-CM

## 2019-08-08 RX ORDER — SODIUM CHLORIDE 0.9 % (FLUSH) 0.9 %
5-40 SYRINGE (ML) INJECTION AS NEEDED
Status: CANCELLED | OUTPATIENT
Start: 2019-08-08

## 2019-08-08 RX ORDER — SODIUM CHLORIDE 0.9 % (FLUSH) 0.9 %
5-40 SYRINGE (ML) INJECTION EVERY 8 HOURS
Status: CANCELLED | OUTPATIENT
Start: 2019-08-08

## 2019-08-09 LAB
ALBUMIN SERPL-MCNC: 4.1 G/DL (ref 3.6–4.8)
ALBUMIN/GLOB SERPL: 1.4 {RATIO} (ref 1.2–2.2)
ALP SERPL-CCNC: 74 IU/L (ref 39–117)
ALT SERPL-CCNC: 32 IU/L (ref 0–32)
AST SERPL-CCNC: 16 IU/L (ref 0–40)
BILIRUB SERPL-MCNC: <0.2 MG/DL (ref 0–1.2)
BUN SERPL-MCNC: 37 MG/DL (ref 8–27)
BUN/CREAT SERPL: 22 (ref 12–28)
CALCIUM SERPL-MCNC: 10.2 MG/DL (ref 8.7–10.3)
CHLORIDE SERPL-SCNC: 105 MMOL/L (ref 96–106)
CO2 SERPL-SCNC: 24 MMOL/L (ref 20–29)
CREAT SERPL-MCNC: 1.67 MG/DL (ref 0.57–1)
EST. AVERAGE GLUCOSE BLD GHB EST-MCNC: 194 MG/DL
GLOBULIN SER CALC-MCNC: 3 G/DL (ref 1.5–4.5)
GLUCOSE SERPL-MCNC: 65 MG/DL (ref 65–99)
HBA1C MFR BLD: 8.4 % (ref 4.8–5.6)
INTERPRETATION: NORMAL
Lab: NORMAL
POTASSIUM SERPL-SCNC: 4.4 MMOL/L (ref 3.5–5.2)
PROT SERPL-MCNC: 7.1 G/DL (ref 6–8.5)
SODIUM SERPL-SCNC: 143 MMOL/L (ref 134–144)

## 2019-08-12 ENCOUNTER — APPOINTMENT (OUTPATIENT)
Dept: NON INVASIVE DIAGNOSTICS | Age: 64
End: 2019-08-12
Attending: INTERNAL MEDICINE
Payer: MEDICAID

## 2019-08-12 ENCOUNTER — HOSPITAL ENCOUNTER (OUTPATIENT)
Age: 64
Setting detail: OBSERVATION
Discharge: HOME | End: 2019-08-13
Attending: INTERNAL MEDICINE | Admitting: INTERNAL MEDICINE
Payer: MEDICAID

## 2019-08-12 DIAGNOSIS — I48.91 ATRIAL FIBRILLATION, UNSPECIFIED TYPE (HCC): ICD-10-CM

## 2019-08-12 PROBLEM — Z98.890 H/O ATRIOVENTRICULAR NODAL ABLATION: Status: ACTIVE | Noted: 2019-08-12

## 2019-08-12 LAB
ANION GAP SERPL CALC-SCNC: 6 MMOL/L (ref 5–15)
BUN SERPL-MCNC: 42 MG/DL (ref 6–20)
BUN/CREAT SERPL: 26 (ref 12–20)
CALCIUM SERPL-MCNC: 9.8 MG/DL (ref 8.5–10.1)
CHLORIDE SERPL-SCNC: 112 MMOL/L (ref 97–108)
CO2 SERPL-SCNC: 24 MMOL/L (ref 21–32)
CREAT SERPL-MCNC: 1.63 MG/DL (ref 0.55–1.02)
ECHO TV REGURGITANT MAX VELOCITY: 212.38 CM/S
ECHO TV REGURGITANT PEAK GRADIENT: 18 MMHG
ERYTHROCYTE [DISTWIDTH] IN BLOOD BY AUTOMATED COUNT: 15.6 % (ref 11.5–14.5)
GLUCOSE BLD STRIP.AUTO-MCNC: 100 MG/DL (ref 65–100)
GLUCOSE BLD STRIP.AUTO-MCNC: 345 MG/DL (ref 65–100)
GLUCOSE BLD STRIP.AUTO-MCNC: 454 MG/DL (ref 65–100)
GLUCOSE BLD STRIP.AUTO-MCNC: 71 MG/DL (ref 65–100)
GLUCOSE SERPL-MCNC: 127 MG/DL (ref 65–100)
HCT VFR BLD AUTO: 33.1 % (ref 35–47)
HGB BLD-MCNC: 10.3 G/DL (ref 11.5–16)
MCH RBC QN AUTO: 27 PG (ref 26–34)
MCHC RBC AUTO-ENTMCNC: 31.1 G/DL (ref 30–36.5)
MCV RBC AUTO: 86.9 FL (ref 80–99)
NRBC # BLD: 0 K/UL (ref 0–0.01)
NRBC BLD-RTO: 0 PER 100 WBC
PLATELET # BLD AUTO: 311 K/UL (ref 150–400)
PMV BLD AUTO: 10 FL (ref 8.9–12.9)
POTASSIUM SERPL-SCNC: 4.4 MMOL/L (ref 3.5–5.1)
RBC # BLD AUTO: 3.81 M/UL (ref 3.8–5.2)
SERVICE CMNT-IMP: ABNORMAL
SERVICE CMNT-IMP: ABNORMAL
SERVICE CMNT-IMP: NORMAL
SERVICE CMNT-IMP: NORMAL
SODIUM SERPL-SCNC: 142 MMOL/L (ref 136–145)
WBC # BLD AUTO: 8 K/UL (ref 3.6–11)

## 2019-08-12 PROCEDURE — 99218 HC RM OBSERVATION: CPT

## 2019-08-12 PROCEDURE — 77030018729 HC ELECTRD DEFIB PAD CARD -B: Performed by: INTERNAL MEDICINE

## 2019-08-12 PROCEDURE — 93650 ICAR CATH ABLTJ AV NODE FUNC: CPT | Performed by: INTERNAL MEDICINE

## 2019-08-12 PROCEDURE — 77030029065 HC DRSG HEMO QCLOT ZMED -B: Performed by: INTERNAL MEDICINE

## 2019-08-12 PROCEDURE — 36415 COLL VENOUS BLD VENIPUNCTURE: CPT

## 2019-08-12 PROCEDURE — 74011250637 HC RX REV CODE- 250/637: Performed by: INTERNAL MEDICINE

## 2019-08-12 PROCEDURE — 85027 COMPLETE CBC AUTOMATED: CPT

## 2019-08-12 PROCEDURE — 74011636320 HC RX REV CODE- 636/320: Performed by: INTERNAL MEDICINE

## 2019-08-12 PROCEDURE — 93308 TTE F-UP OR LMTD: CPT

## 2019-08-12 PROCEDURE — 77030027107 HC PTCH EXT REF CARTO3 J&J -F: Performed by: INTERNAL MEDICINE

## 2019-08-12 PROCEDURE — C1893 INTRO/SHEATH, FIXED,NON-PEEL: HCPCS | Performed by: INTERNAL MEDICINE

## 2019-08-12 PROCEDURE — C1732 CATH, EP, DIAG/ABL, 3D/VECT: HCPCS | Performed by: INTERNAL MEDICINE

## 2019-08-12 PROCEDURE — 74011250636 HC RX REV CODE- 250/636: Performed by: INTERNAL MEDICINE

## 2019-08-12 PROCEDURE — 74011636637 HC RX REV CODE- 636/637: Performed by: INTERNAL MEDICINE

## 2019-08-12 PROCEDURE — 82962 GLUCOSE BLOOD TEST: CPT

## 2019-08-12 PROCEDURE — C1894 INTRO/SHEATH, NON-LASER: HCPCS | Performed by: INTERNAL MEDICINE

## 2019-08-12 PROCEDURE — 80048 BASIC METABOLIC PNL TOTAL CA: CPT

## 2019-08-12 RX ORDER — SODIUM CHLORIDE 0.9 % (FLUSH) 0.9 %
5-40 SYRINGE (ML) INJECTION EVERY 8 HOURS
Status: DISCONTINUED | OUTPATIENT
Start: 2019-08-12 | End: 2019-08-13 | Stop reason: HOSPADM

## 2019-08-12 RX ORDER — HYDROCODONE BITARTRATE AND ACETAMINOPHEN 5; 325 MG/1; MG/1
1 TABLET ORAL
Status: DISCONTINUED | OUTPATIENT
Start: 2019-08-12 | End: 2019-08-13 | Stop reason: HOSPADM

## 2019-08-12 RX ORDER — INSULIN LISPRO 100 [IU]/ML
INJECTION, SOLUTION INTRAVENOUS; SUBCUTANEOUS
Status: DISCONTINUED | OUTPATIENT
Start: 2019-08-12 | End: 2019-08-13 | Stop reason: DRUGHIGH

## 2019-08-12 RX ORDER — INSULIN GLARGINE 100 [IU]/ML
60 INJECTION, SOLUTION SUBCUTANEOUS DAILY
Status: DISCONTINUED | OUTPATIENT
Start: 2019-08-12 | End: 2019-08-13 | Stop reason: HOSPADM

## 2019-08-12 RX ORDER — BUPROPION HYDROCHLORIDE 150 MG/1
300 TABLET ORAL DAILY
Status: DISCONTINUED | OUTPATIENT
Start: 2019-08-13 | End: 2019-08-13 | Stop reason: HOSPADM

## 2019-08-12 RX ORDER — INSULIN LISPRO 100 [IU]/ML
10 INJECTION, SOLUTION INTRAVENOUS; SUBCUTANEOUS
Status: DISCONTINUED | OUTPATIENT
Start: 2019-08-12 | End: 2019-08-12

## 2019-08-12 RX ORDER — SODIUM CHLORIDE 0.9 % (FLUSH) 0.9 %
5-40 SYRINGE (ML) INJECTION AS NEEDED
Status: DISCONTINUED | OUTPATIENT
Start: 2019-08-12 | End: 2019-08-13 | Stop reason: HOSPADM

## 2019-08-12 RX ORDER — LISINOPRIL 5 MG/1
5 TABLET ORAL DAILY
Status: DISCONTINUED | OUTPATIENT
Start: 2019-08-13 | End: 2019-08-13 | Stop reason: HOSPADM

## 2019-08-12 RX ORDER — LIDOCAINE HYDROCHLORIDE 10 MG/ML
INJECTION INFILTRATION; PERINEURAL AS NEEDED
Status: DISCONTINUED | OUTPATIENT
Start: 2019-08-12 | End: 2019-08-12 | Stop reason: HOSPADM

## 2019-08-12 RX ORDER — SODIUM CHLORIDE 9 MG/ML
INJECTION, SOLUTION INTRAVENOUS
Status: COMPLETED | OUTPATIENT
Start: 2019-08-12 | End: 2019-08-12

## 2019-08-12 RX ORDER — AMLODIPINE BESYLATE 5 MG/1
5 TABLET ORAL
Status: DISCONTINUED | OUTPATIENT
Start: 2019-08-12 | End: 2019-08-13 | Stop reason: HOSPADM

## 2019-08-12 RX ORDER — CARVEDILOL 12.5 MG/1
12.5 TABLET ORAL 2 TIMES DAILY WITH MEALS
Status: DISCONTINUED | OUTPATIENT
Start: 2019-08-12 | End: 2019-08-13 | Stop reason: HOSPADM

## 2019-08-12 RX ORDER — ISOSORBIDE MONONITRATE 30 MG/1
30 TABLET, EXTENDED RELEASE ORAL DAILY
Status: DISCONTINUED | OUTPATIENT
Start: 2019-08-13 | End: 2019-08-13 | Stop reason: HOSPADM

## 2019-08-12 RX ORDER — PANTOPRAZOLE SODIUM 40 MG/1
40 TABLET, DELAYED RELEASE ORAL
Status: DISCONTINUED | OUTPATIENT
Start: 2019-08-13 | End: 2019-08-13 | Stop reason: HOSPADM

## 2019-08-12 RX ORDER — MONTELUKAST SODIUM 10 MG/1
10 TABLET ORAL DAILY
Status: DISCONTINUED | OUTPATIENT
Start: 2019-08-13 | End: 2019-08-13 | Stop reason: HOSPADM

## 2019-08-12 RX ORDER — ACETAMINOPHEN 325 MG/1
650 TABLET ORAL
Status: DISCONTINUED | OUTPATIENT
Start: 2019-08-12 | End: 2019-08-13 | Stop reason: HOSPADM

## 2019-08-12 RX ORDER — ASPIRIN 81 MG/1
81 TABLET ORAL DAILY
Status: DISCONTINUED | OUTPATIENT
Start: 2019-08-13 | End: 2019-08-13

## 2019-08-12 RX ORDER — ONDANSETRON 2 MG/ML
4 INJECTION INTRAMUSCULAR; INTRAVENOUS
Status: DISCONTINUED | OUTPATIENT
Start: 2019-08-12 | End: 2019-08-13 | Stop reason: HOSPADM

## 2019-08-12 RX ORDER — SODIUM CHLORIDE 0.9 % (FLUSH) 0.9 %
5-40 SYRINGE (ML) INJECTION EVERY 8 HOURS
Status: DISCONTINUED | OUTPATIENT
Start: 2019-08-12 | End: 2019-08-12 | Stop reason: HOSPADM

## 2019-08-12 RX ORDER — FUROSEMIDE 40 MG/1
40 TABLET ORAL DAILY
Status: DISCONTINUED | OUTPATIENT
Start: 2019-08-13 | End: 2019-08-13 | Stop reason: HOSPADM

## 2019-08-12 RX ORDER — CEFAZOLIN SODIUM 1 G/3ML
INJECTION, POWDER, FOR SOLUTION INTRAMUSCULAR; INTRAVENOUS AS NEEDED
Status: DISCONTINUED | OUTPATIENT
Start: 2019-08-12 | End: 2019-08-12 | Stop reason: HOSPADM

## 2019-08-12 RX ORDER — LANOLIN ALCOHOL/MO/W.PET/CERES
4.5 CREAM (GRAM) TOPICAL
Status: DISCONTINUED | OUTPATIENT
Start: 2019-08-12 | End: 2019-08-13 | Stop reason: HOSPADM

## 2019-08-12 RX ORDER — SODIUM CHLORIDE 0.9 % (FLUSH) 0.9 %
5-40 SYRINGE (ML) INJECTION AS NEEDED
Status: DISCONTINUED | OUTPATIENT
Start: 2019-08-12 | End: 2019-08-12 | Stop reason: HOSPADM

## 2019-08-12 RX ORDER — INSULIN LISPRO 100 [IU]/ML
8 INJECTION, SOLUTION INTRAVENOUS; SUBCUTANEOUS ONCE
Status: COMPLETED | OUTPATIENT
Start: 2019-08-12 | End: 2019-08-12

## 2019-08-12 RX ORDER — ATORVASTATIN CALCIUM 20 MG/1
40 TABLET, FILM COATED ORAL DAILY
Status: DISCONTINUED | OUTPATIENT
Start: 2019-08-12 | End: 2019-08-13 | Stop reason: HOSPADM

## 2019-08-12 RX ORDER — SERTRALINE HYDROCHLORIDE 50 MG/1
200 TABLET, FILM COATED ORAL DAILY
Status: DISCONTINUED | OUTPATIENT
Start: 2019-08-13 | End: 2019-08-13 | Stop reason: HOSPADM

## 2019-08-12 RX ADMIN — INSULIN LISPRO 8 UNITS: 100 INJECTION, SOLUTION INTRAVENOUS; SUBCUTANEOUS at 21:15

## 2019-08-12 RX ADMIN — AMLODIPINE BESYLATE 5 MG: 5 TABLET ORAL at 21:17

## 2019-08-12 RX ADMIN — INSULIN GLARGINE 60 UNITS: 100 INJECTION, SOLUTION SUBCUTANEOUS at 21:16

## 2019-08-12 RX ADMIN — ATORVASTATIN CALCIUM 40 MG: 20 TABLET, FILM COATED ORAL at 21:18

## 2019-08-12 RX ADMIN — APIXABAN 5 MG: 5 TABLET, FILM COATED ORAL at 21:17

## 2019-08-12 RX ADMIN — HYDROCODONE BITARTRATE AND ACETAMINOPHEN 1 TABLET: 5; 325 TABLET ORAL at 23:12

## 2019-08-12 RX ADMIN — CARVEDILOL 12.5 MG: 12.5 TABLET, FILM COATED ORAL at 21:17

## 2019-08-12 RX ADMIN — Medication 10 ML: at 21:18

## 2019-08-12 RX ADMIN — MELATONIN TAB 3 MG 4.5 MG: 3 TAB at 23:12

## 2019-08-12 NOTE — PROGRESS NOTES
TRANSFER - IN REPORT:    Verbal report received from GEORGES SNYDER [facility]) on Nowell Dancer  being received from Cath Lab(unit) for routine post - op      Report consisted of patients Situation, Background, Assessment and   Recommendations(SBAR). Information from the following report(s) SBAR, Kardex and Recent Results was reviewed with the receiving nurse. Opportunity for questions and clarification was provided. Assessment completed upon patients arrival to unit and care assumed. .. Bedside and Verbal shift change report given to Hazel Posadas (oncoming nurse) by Lore Rodas RN (offgoing nurse). Report included the following information SBAR, Kardex and Recent Results.

## 2019-08-12 NOTE — PROGRESS NOTES
Patient arrived. ID and allergies verified verbally with patient. Pt voices understanding of procedure to be performed. Consent obtained. Pt prepped for procedure. Labs sent.  13>40 TRANSFER - OUT REPORT:    Verbal report given to TAYLOR Stiles(name) on [facility]  being transferred to (unit) for routine progression of care       Report consisted of patients Situation, Background, Assessment and   Recommendations(SBAR). Information from the following report(s) Procedure Summary was reviewed with the receiving nurse. Lines:   Peripheral IV 08/12/19 Right Antecubital (Active)   Site Assessment Clean, dry, & intact 8/12/2019 11:02 AM        Opportunity for questions and clarification was provided. Patient transported with:   Registered Nurse  15:45, TRANSFER - IN REPORT:    Verbal report received from [facility] on [facility]  being received from (unit) for routine progression of care      Report consisted of patients Situation, Background, Assessment and   Recommendations(SBAR). Information from the following report(s) Procedure Summary was reviewed with the receiving nurse. Opportunity for questions and clarification was provided. Assessment completed upon patients arrival to unit and care assumed. 16:00,accucheck is 71,juice given. 16:15,accucheck is 100. Echo competed. 18:45 TRANSFER - OUT REPORT:    Verbal report given to TAYLOR Vasquez(name) on [facility]  being transferred to [facility](unit) for routine progression of care       Report consisted of patients Situation, Background, Assessment and   Recommendations(SBAR). Information from the following report(s) Procedure Summary was reviewed with the receiving nurse. Lines:   Peripheral IV 08/12/19 Right Antecubital (Active)   Site Assessment Clean, dry, & intact 8/12/2019 11:02 AM        Opportunity for questions and clarification was provided.       Patient transported with:   Monitor  Registered Nurse

## 2019-08-12 NOTE — PROCEDURES
Cardiac Electrophysiology Report      PATIENT INFORMATION        Patient Name: Liana Leyva  MRN: [id number]              Study Date: 2019    YOB: 1955   Age: 59 y.o. Gender: female      Procedure:  Atrioventricular Node AblationRa    Referring Physician:  Jennifer Rider NP and Dr. Halima Beltrán     Duty Name   Electrophysiologist Nahun Barr MD   Monitor Patria Cruz RN   Circulator Randi Franklin RN, Mariely Meyers, RT       PATIENT HISTORY     Dee Dee Raman is a 59 y. o. female with nonischemic cardiomyopathy, COPD on home oxygen, chronic shortness of breath, diabetes mellitus, hypertension, obesity and a single chamber [employer] ICD who has had recurrent ICD shocks for atrial fibrillation. She now presents for AV node ablation. PROCEDURE     The patient was brought to the Cardiac Electrophysiology laboratory in a post-absorptive, fasting state. Informed consent was obtained. A peripheral IV was in place. Continuous electrocardiographic, blood pressure, O2 saturation and  CO2 monitoring was initiated. Self-adhesive cardioversion patches were positioned on the chest. Conscious sedation was administered per protocol. The patient was then prepped and draped in the usual sterile fashion. Both groins were infiltrated with a 50/50 mixture of Lidocaine (1%) and bupivicaine (0.5%). Vascular access was obtained in the right common femoral vein using an SR0 sheath. Through this sheath, an 8mm, non-irrigated tip ablation catheter was then advanced to the level of the His bundle. Three-dimensional mapping was utilized to minimize fluoroscopy. Several applications of RF energy were delivered at this site until complete heart block was achieved. This block persisted following an observation period. Contrast injection demonstrated the left subclavian vein was patent.  At the conclusion of the procedure, all catheters and sheaths were removed and hemostasis obtained by direct manual compression. The patient remained hemodynamically stable, tolerated the procedure well and was transferred in stable condition. There were no immediate complications encountered during the procedure. There was minimal blood loss and no specimen were removed. FINDINGS      1. The baseline ECG revealed sinus rhythm  2. Using an 8mm, non-irrigated bi-directional tip ablation catheter through an SR0 sheath, several applications of RF energy were delivered at the site of the His bundle electrogram until complete heart block was achieved with a ventricular escape rhythm in the 40's. This CHB persisted following an observation period. 3. The ICD was reprogrammed to a lower rate of 80 bpm.       RADIOLOGY SUMMARY       Total    Fluoro Time (minutes)  3.5   Radiation Dose (mGy) 75        CONCLUSION     1. Successful AV node ablation  2. Obtain echocardiogram; Plan for upgrade to dual chamber vs biventricular ICD in future based on LV function  3. Continue anticoagulation indefinitely. 4. Follow up in 2 weeks in EP clinic to initiate rate lowering protocol. 5. Monitor on telemetry until noon tomorrow for ventricular tachycardia. 6. Follow up with Ramon Cerna NP and Dr. Dina Azevedo as scheduled. Thank you, Ramon Cerna NP and Dr. Dina Azevedo for allowing me to participate in the care of this extraordinarily pleasant female.               Yvette Mckinney MD  Cardiac Electrophysiology / Cardiology    [place] [id number].  [address] [place] [address], [address] [place] [address], [address] [place] [address], [address] [place] [address]  [phone] / [phone] Fax   [phone] / [phone] Fax

## 2019-08-12 NOTE — H&P
HISTORY OF PRESENTING ILLNESS      Hannah Garcia is a 59 y.o. female with nonischemic cardiomyopathy, diabetes mellitus, hypertension, obesity and ICD who was recently evaluated in the emergency room for an episode of sudden onset syncope. Kerline Gonzalez is uncertain whether she received an ICD shock.  She denies incontinence of her urine, incontinence of her bowels, tongue biting.  She sustained rib fractures as a result of the syncopal episode.  She believes her blood sugar was low at the time.  She has had syncope in the past.  Following evaluation emergency room she was discharged home.  She now presents for further evaluation of her syncope. ICD interrogation reveals an ICD shock in June 2018 that was highly suspicious for atrial fibrillation. Her coreg was adjusted and she was started on eliquis. She had a fall (unrelated to syncope) since her last visit.  She denies recurrent episodes of syncope.  Device interrogation today fails to reveal recurrent episodes of AF.  She is tolerating her anticoagulation thus far. Her rate response was adjusted. She returns today after recent ICD shock for VF for which she was seen in [facility] and was started on Amiodarone. Upon further review on her ICD interrogation today, shock EGMs shows that she received ATP and shock for AF. She reports feeling fatigue and has chronic sob d/t COPD, oxygen dependent. EKG shows sinus rhythm with PVC. Previous echocardiogram in 2016 demonstrated a reduced EF with mildly dilated LA. She recently completed a course of antibiotics for bronchitis.         ACTIVE PROBLEM LIST           Patient Active Problem List     Diagnosis Date Noted    ICD (implantable cardioverter-defibrillator) in place 08/06/2018    Type 2 diabetes mellitus with diabetic neuropathy ([address] 04/17/2018    Severe obesity (BMI 35.0-39. 9) with comorbidity ([address] 04/17/2018    Mixed hyperlipidemia 02/04/2017    Cardiomyopathy ([address] 01/27/2017    Non morbid obesity 11/04/2016    Encounter for long-term (current) use of insulin ([facility] 06/14/2016    BMI 35.0-35.9,adult 01/04/2016    Essential hypertension 01/04/2016    Type 2 diabetes mellitus with diabetic chronic kidney disease ([facility] 01/04/2016    Obesity (BMI 30.0-34.9) 08/13/2015    Anxiety state, unspecified 07/02/2014    Invalid Neuropsych Profile With Very Strong Evidence Of Poor Test Taking Effort/Symptom Exaggeration/Malingering 04/24/2014    Memory loss 04/23/2014    Headache(784.0) 04/23/2014             PAST MEDICAL HISTORY           Past Medical History:   Diagnosis Date    Anxiety      Asthma      Asthma      Carpal tunnel syndrome on both sides      Chronic mental illness      Coronary artery disease      Depression      Fracture       right ankle    GERD (gastroesophageal reflux disease)      Hypertension      Memory disorder      PUD (peptic ulcer disease)      Type II or unspecified type diabetes mellitus without mention of complication, uncontrolled      Vertigo               PAST SURGICAL HISTORY            Past Surgical History:   Procedure Laterality Date    HX ANKLE FRACTURE TX        HX CORONARY STENT PLACEMENT   2014    HX FREE SKIN GRAFT        HX ORTHOPAEDIC                 ALLERGIES           Allergies   Allergen Reactions    Beef Derived (Bovine) Hives    Shellfish Derived Hives            FAMILY HISTORY            Family History   Problem Relation Age of Onset    Cancer Mother      negative for cardiac disease         SOCIAL HISTORY      Social History               Socioeconomic History    Marital status:        Spouse name: Not on file    Number of children: Not on file    Years of education: Not on file    Highest education level: Not on file   Tobacco Use    Smoking status: Never Smoker    Smokeless tobacco: Never Used   Substance and Sexual Activity    Alcohol use: No    Drug use: No    Sexual activity: Not Currently          MEDICATIONS           Current Outpatient Medications   Medication Sig    insulin glargine (LANTUS SOLOSTAR U-100 INSULIN) 100 unit/mL (3 mL) inpn Inject 60 units between 7 - 8 PM . Stop Basaglar    insulin aspart U-100 (NOVOLOG FLEXPEN U-100 INSULIN) 100 unit/mL inpn INJECT 10 -  15 units before breakfast , lunch and dinner  (STOP NOVOLIN R AND APIDRA)    carvedilol (COREG) 12.5 mg tablet Take 1 Tab by mouth two (2) times daily (with meals).  Insulin Needles, Disposable, 32 gauge x 5/32\" ndle USE TWICE DAILY    famotidine (PEPCID) 40 mg tablet Take 40 mg by mouth two (2) times a day.  melatonin 5 mg cap capsule Take 5 mg by mouth nightly.  apixaban (ELIQUIS) 5 mg tablet Take 1 Tab by mouth two (2) times a day.  omeprazole (PRILOSEC) 20 mg capsule Take 20 mg by mouth daily.  furosemide (LASIX) 40 mg tablet Take  by mouth daily.  montelukast (SINGULAIR) 10 mg tablet Take 10 mg by mouth daily.  SITagliptin (JANUVIA) 50 mg tablet TAKE ONE TABLET BY MOUTH IN THE MORNING    promethazine (PHENERGAN) 6.25 mg/5 mL syrup      tiotropium (SPIRIVA WITH HANDIHALER) 18 mcg inhalation capsule Take 1 Cap by inhalation daily.  PROLIA 60 mg/mL injection      lisinopril (PRINIVIL, ZESTRIL) 5 mg tablet Take 5 mg by mouth daily.  atorvastatin (LIPITOR) 40 mg tablet Take  by mouth daily.  guaiFENesin-dextromethorphan SR (MUCINEX DM) 600-30 mg per tablet Take 1 Tab by mouth two (2) times a day.  spironolactone (ALDACTONE) 25 mg tablet Take  by mouth daily.  sertraline (ZOLOFT) 100 mg tablet Take 150 mg by mouth daily.  aspirin delayed-release 81 mg tablet Take  by mouth daily.  gabapentin (NEURONTIN) 100 mg capsule 100 mg two (2) times a day.  TRUEPLUS LANCETS 33 gauge misc      FERROUS FUMARATE (IRON PO) Take 27 mg by mouth daily.  PRENATAL VIT W-CA,FE,FA,<1 MG, (PRENATAL VITAMIN PO) Take 2 Tabs by mouth daily.     albuterol (PROAIR HFA) 90 mcg/actuation inhaler Take  by inhalation.  amLODIPine (NORVASC) 5 mg tablet Take 5 mg by mouth daily.  Lancets misc Test blood glucose 4 times daily    meclizine (ANTIVERT) 25 mg tablet Take 1 tablet by mouth three (3) times daily as needed.  buPROPion XL (WELLBUTRIN XL) 300 mg XL tablet Take 300 mg by mouth every morning.  zolpidem (AMBIEN) 10 mg tablet Take  by mouth nightly as needed for Sleep.  calcium-cholecalciferol, D3, (CALCARB 600 WITH VITAMIN D) tablet Take 1 Tab by mouth daily.  nitroglycerin (NITROSTAT) 0.4 mg SL tablet by SubLINGual route every five (5) minutes as needed for Chest Pain.  loratadine 10 mg cap Take  by mouth.      No current facility-administered medications for this visit.          I have reviewed the nurses notes, vitals, problem list, allergy list, medical history, family, social history and medications.         REVIEW OF SYMPTOMS      General: Pt denies excessive weight gain or loss. Pt is able to conduct ADL's  HEENT: Denies blurred vision, headaches, hearing loss, epistaxis and difficulty swallowing. Respiratory: Denies cough, congestion, shortness of breath, BECKWITH, wheezing or stridor. Cardiovascular: Denies precordial pain, palpitations, edema or PND  Gastrointestinal: Denies poor appetite, indigestion, abdominal pain or blood in stool  Genitourinary: Denies hematuria, dysuria, increased urinary frequency  Musculoskeletal: Denies joint pain or swelling from muscles or joints  Neurologic: Denies tremor, paresthesias, headache, or sensory motor disturbance  Psychiatric: Denies confusion, insomnia, depression  Integumentray: Denies rash, itching or ulcers. Hematologic: Denies easy bruising, bleeding         PHYSICAL EXAMINATION      There were no vitals filed for this visit. General: Well developed, in no acute distress. HEENT: No jaundice, oral mucosa moist, no oral ulcers  Neck: Supple, no stiffness, no lymphadenopathy, supple  Heart:  Normal S1/S2 negative S3 or S4.  Regular, no murmur, gallop or rub, no jugular venous distention  Respiratory: Clear bilaterally x 4, no wheezing or rales  Abdomen:   Soft, non-tender, bowel sounds are active.   Extremities:  No edema, normal cap refill, no cyanosis. Musculoskeletal: No clubbing, no deformities  Neuro: A&Ox3, speech clear, gait stable, cooperative, no focal neurologic deficits  Skin: Skin color is normal. No rashes or lesions. Non diaphoretic, moist.  Vascular: 2+ pulses symmetric in all extremities         DIAGNOSTIC DATA      EKG:          LABORATORY DATA    No results found for: WBC, HGBPOC, HGB, HGBP, HCTPOC, HCT, PHCT, RBCH, PLT, MCV, HGBEXT, HCTEXT, PLTEXT         Lab Results   Component Value Date/Time     Sodium 143 04/10/2019 12:19 PM     Potassium 4.6 04/10/2019 12:19 PM     Chloride 104 04/10/2019 12:19 PM     CO2 24 04/10/2019 12:19 PM     Glucose 110 (H) 04/10/2019 12:19 PM     BUN 31 (H) 04/10/2019 12:19 PM     Creatinine 1.54 (H) 04/10/2019 12:19 PM     BUN/Creatinine ratio 20 04/10/2019 12:19 PM     GFR est AA 41 (L) 04/10/2019 12:19 PM     GFR est non-AA 36 (L) 04/10/2019 12:19 PM     Calcium 10.5 (H) 04/10/2019 12:19 PM     Bilirubin, total <0.2 04/10/2019 12:19 PM     AST (SGOT) 19 04/10/2019 12:19 PM     Alk. phosphatase 92 04/10/2019 12:19 PM     Protein, total 7.2 04/10/2019 12:19 PM     Albumin 4.3 04/10/2019 12:19 PM     A-G Ratio 1.5 04/10/2019 12:19 PM     ALT (SGPT) 28 04/10/2019 12:19 PM             ASSESSMENT      1. Cardiomyopathy                        A. Non-ischemic                        V. NYHA class 2  2. CAD                        E. Native  3. Percutaneous coronary transluminal angioplasty   4. ICD                        G. [employer]                        Q. Single chamber--> CRTD                        C.  Primary prevention  5.  Atrial fibrillation                        A.  Inappropriate ICD shock         PLAN      Plan for AV node ablation however will plan to perform without IV sedation due to underlying lung disease

## 2019-08-12 NOTE — Clinical Note
TRANSFER - IN REPORT:     Verbal report received from: bedside. Report consisted of patient's Situation, Background, Assessment and   Recommendations(SBAR). Opportunity for questions and clarification was provided. Assessment completed upon patient's arrival to unit and care assumed. Patient transported with a Registered Nurse.

## 2019-08-12 NOTE — Clinical Note
TRANSFER - OUT REPORT:     Verbal report given to: bedside. Report consisted of patient's Situation, Background, Assessment and   Recommendations(SBAR). Opportunity for questions and clarification was provided. Patient transported with a Registered Nurse.

## 2019-08-13 VITALS
TEMPERATURE: 97.7 F | HEIGHT: 64 IN | BODY MASS INDEX: 35.68 KG/M2 | WEIGHT: 209 LBS | OXYGEN SATURATION: 96 % | RESPIRATION RATE: 18 BRPM | DIASTOLIC BLOOD PRESSURE: 65 MMHG | HEART RATE: 76 BPM | SYSTOLIC BLOOD PRESSURE: 113 MMHG

## 2019-08-13 LAB
GLUCOSE BLD STRIP.AUTO-MCNC: 282 MG/DL (ref 65–100)
GLUCOSE BLD STRIP.AUTO-MCNC: 285 MG/DL (ref 65–100)
SERVICE CMNT-IMP: ABNORMAL
SERVICE CMNT-IMP: ABNORMAL

## 2019-08-13 PROCEDURE — 82962 GLUCOSE BLOOD TEST: CPT

## 2019-08-13 PROCEDURE — 74011250637 HC RX REV CODE- 250/637: Performed by: INTERNAL MEDICINE

## 2019-08-13 PROCEDURE — 74011250637 HC RX REV CODE- 250/637: Performed by: NURSE PRACTITIONER

## 2019-08-13 PROCEDURE — 99218 HC RM OBSERVATION: CPT

## 2019-08-13 PROCEDURE — 74011636637 HC RX REV CODE- 636/637: Performed by: INTERNAL MEDICINE

## 2019-08-13 RX ORDER — GUAIFENESIN 100 MG/5ML
81 LIQUID (ML) ORAL DAILY
Status: DISCONTINUED | OUTPATIENT
Start: 2019-08-13 | End: 2019-08-13 | Stop reason: HOSPADM

## 2019-08-13 RX ADMIN — APIXABAN 5 MG: 5 TABLET, FILM COATED ORAL at 09:13

## 2019-08-13 RX ADMIN — MONTELUKAST SODIUM 10 MG: 10 TABLET, COATED ORAL at 09:12

## 2019-08-13 RX ADMIN — INSULIN LISPRO 5 UNITS: 100 INJECTION, SOLUTION INTRAVENOUS; SUBCUTANEOUS at 09:10

## 2019-08-13 RX ADMIN — FUROSEMIDE 40 MG: 40 TABLET ORAL at 09:12

## 2019-08-13 RX ADMIN — INSULIN LISPRO 3 UNITS: 100 INJECTION, SOLUTION INTRAVENOUS; SUBCUTANEOUS at 01:12

## 2019-08-13 RX ADMIN — LISINOPRIL 5 MG: 5 TABLET ORAL at 09:13

## 2019-08-13 RX ADMIN — SERTRALINE HYDROCHLORIDE 200 MG: 50 TABLET ORAL at 09:12

## 2019-08-13 RX ADMIN — Medication 10 ML: at 05:22

## 2019-08-13 RX ADMIN — BUPROPION HYDROCHLORIDE 300 MG: 150 TABLET, FILM COATED, EXTENDED RELEASE ORAL at 09:12

## 2019-08-13 RX ADMIN — SITAGLIPTIN 50 MG: 25 TABLET, FILM COATED ORAL at 09:13

## 2019-08-13 RX ADMIN — ASPIRIN 81 MG 81 MG: 81 TABLET ORAL at 11:37

## 2019-08-13 RX ADMIN — ISOSORBIDE MONONITRATE 30 MG: 30 TABLET, EXTENDED RELEASE ORAL at 09:12

## 2019-08-13 RX ADMIN — PANTOPRAZOLE SODIUM 40 MG: 40 TABLET, DELAYED RELEASE ORAL at 09:12

## 2019-08-13 RX ADMIN — CARVEDILOL 12.5 MG: 12.5 TABLET, FILM COATED ORAL at 09:12

## 2019-08-13 RX ADMIN — INSULIN LISPRO 5 UNITS: 100 INJECTION, SOLUTION INTRAVENOUS; SUBCUTANEOUS at 13:30

## 2019-08-13 NOTE — PROGRESS NOTES
Bedside and Verbal shift change report given to Blake (oncoming nurse) by Azeb Soto (offgoing nurse). Report included the following information SBAR, Kardex, ED Summary, Procedure Summary, Intake/Output, MAR, Recent Results and Cardiac Rhythm av paced.     1796 Liana at bedside, pt can be dcd at 1200 if tele ok

## 2019-08-13 NOTE — ROUTINE PROCESS
PCP NANDA apt scheduled with ALFREDO Clarke on 8/26/19(earliest avail apt) at 2:15PM.  Apt added to AVS

## 2019-08-13 NOTE — PROGRESS NOTES
8- CASE MANAGEMENT NOTE:  Arrangements made for the patient to be transported home today via Medicaid taxi-Confirm #[id number].  YUNIER Torres, CM

## 2019-08-13 NOTE — PROGRESS NOTES
8- Reason for Admission:  Node Ablation                   RRAT Score:    19              Do you (patient/family) have any concerns for transition/discharge? No                Plan for utilizing home health:  No     Current Advanced Directive/Advance Care Plan:  Not on file            Transition of Care Plan:   Home with     I met with the patient and her , Mag Hall ([phone]), to determine potential discharge needs. The patient and her  (works M-F) live in a one story house with 4 entry steps. She has a Medicaid [facility] M-F fri 9-5 who assist her as needed. She has a cane and rolling walker. Her PCP is Dr. Radu Mckenzie who has no nurse navigator. She has prescription drug coverage and gets her medications from [address] [place] [address] in [facility]. Observation status was explained to her, she signed the [facility] [place] letter, was given a copy and the original was placed on her chart.       Care Management Interventions  PCP Verified by CM: Yes(Dr. Davey Shoemaker nurse navigator)  Discharge Durable Medical Equipment: No(Has a cane and rolling walker)  Physical Therapy Consult: No  Occupational Therapy Consult: No  Speech Therapy Consult: No  Current Support Network: Lives with Spouse, Own Home  Confirm Follow Up Transport: Cab  Plan discussed with Pt/Family/Caregiver: Yes  Discharge Location  Discharge Placement: (Home with )    YUNIER Nelson, CM

## 2019-08-13 NOTE — DISCHARGE INSTRUCTIONS
ELECTROPHYSIOLOGY STUDY/ABLATION DISCHARGE INSTRUCTIONS          Your Recovery: You had an electrophysiology study for a problem with your heartbeat. You may also have had a catheter ablation to try to correct the problem. You may have swelling, bruising, or a small lump around the site where the catheters went into your body. These should go away in 3 to 4 weeks. Patient Instructions Post-ablation    1. No heavy lifting or exercises for 1 week. This includes the following:  Do not push or move furniture, jog or run    2. Do not drive for 2 days. 3.  Call Dr. Mirna Arenas  at [phone] if you experience any of the following symptoms:  Dizziness, lightheadedness, fainting spells  Lack of energy  Shortness of breath  Rapid heart rate  Chest or muscle twitches  Blurry vision, double vision, weakness, numbness  Nausea, vomiting  Fever  Bleeding in the stool, black stool  Leg swelling, pain    4. Follow-up with Dr. Mirna Arenas   in 30 days to readjust pacing rate. My nurse will call you.

## 2019-08-13 NOTE — PROGRESS NOTES
Discharge instructions, including post ablation care, were reviewed with patient. All questions were answered. PCP appointment has been scheduled and placed on AVS. Dr. Rankin Dire office will call patient with appointment time/date. Patient received a copy of her discharge papers. CM will set up MAP transport home    IV and heart monitor will be removed prior to discharge.     Done at [facility]

## 2019-08-13 NOTE — PROGRESS NOTES
Bedside and Verbal shift change report given to Governor Rob RN (oncoming nurse) by Rosalina Brush RN (offgoing nurse). Report included the following information SBAR, Kardex, ED Summary, Procedure Summary, Intake/Output, Recent Results, Med Rec Status and Cardiac Rhythm AV Paced. 0824: Notified Dr. [facility] call center concerning pt Blood Glucose level 0f 34 33 96. Dr ordered 8 units of Lispro then check bs levels at 2300. If bs level still above 350 call the dr, otherwise treat according to sliding scale. 2309: Pt b/g rechecked resulting at 345. Pt reported she gives herself 3 units of Lispro to treat B/G of 345.    0100: Pt eating healthy choice dinner. 0112: Administered 3 units of Lispro. Per Pt sliding scale. 5436Clarice Minus in Pharmacy concerning Insulin Humalog sliding scale as Pt not sure of the scale she follows at home. Bedside and Verbal shift change report given to Anthony Christian RN and TAYLOR Arroyo (oncoming nurse) by Dannie Lopez (offgoing nurse). Report included the following information SBAR, Kardex, ED Summary, Intake/Output, Recent Results, Med Rec Status and Cardiac Rhythm NSR/ST.

## 2019-08-13 NOTE — DISCHARGE SUMMARY
Cardiology Discharge Summary     Patient ID:  Иван Kimble  [id number]  21 y.o.  1955    Admit Date: 8/12/2019    Discharge Date: 8/13/2019     Admitting Physician: Sara Resendez MD     Discharge Physician: Dr Ameena Mancia , NP    Admission Diagnoses: Atrial fibrillation, unspecified type [facility]) [I48.91]  H/O atrioventricular anna ablation [Z98.890]    Discharge Diagnoses: Active Problems:    H/O atrioventricular anna ablation (8/12/2019)        Discharge Condition: Good    Cardiology Procedures this Admission:  AV node ablation     Consults: None        Discharge Exam:     Visit Vitals  /70 (BP 1 Location: Left arm, BP Patient Position: At rest)   Pulse 80   Temp 97.9 °F (36.6 °C)   Resp 16   Ht 5' 4\" (1.626 m)   Wt 209 lb (94.8 kg)   SpO2 95%   Breastfeeding? No   BMI 35.87 kg/m²     General Appearance:  Well developed, obese,alert and oriented x 3, and individual in no acute distress. Ears/Nose/Mouth/Throat:   Hearing grossly normal.         Neck: Supple. Chest:   Lungs clear to auscultation bilaterally. Cardiovascular:  Regular rate and rhythm,  no murmur. Device site unremarkable    Abdomen:   Soft, non-tender, bowel sounds are active. Extremities: No edema bilaterally. Skin: Warm and dry. Right groin covered with CDI bandage- no hematoma, no drainage or redness. +2 Dp pulses. Paul LE warm                HOSPITAL COURSE: Иван Kimble is 59 y.o. female who  Is here for an AV node ablation. She was kept over night for observation to observe for ventricular arrhythmias. A/P  AF/ S/p AV node ablation: d/c amiodarone. Continue eliquis.   Reviewed post procedure activity restrictions     Cardiomyopathy, NICM: BB/ACEI  ICD in situ: plans to upgrade to BIV ICD in 1 month- office will arrange  Htn: BP controlled continue home regiment  PVD w stent: cont ASA/statin  DM: resume home meds    Reviewed plan with the patient and , all questions answered  Disposition: home    Patient Instructions:   Current Discharge Medication List      CONTINUE these medications which have NOT CHANGED    Details   SITagliptin (JANUVIA) 50 mg tablet TAKE ONE TABLET BY MOUTH IN THE MORNING  Qty: 80 Tab, Refills: 3    Associated Diagnoses: Type 2 diabetes mellitus with hyperglycemia, unspecified whether long term insulin use (HCC)      apixaban (ELIQUIS) 5 mg tablet Take 1 Tab by mouth two (2) times a day. Qty: 180 Tab, Refills: 3    Associated Diagnoses: Cardiomyopathy, unspecified type ([address] ICD (implantable cardioverter-defibrillator) in place      SYMBICORT 160-4.5 mcg/actuation HFAA INHALE 2 PUFFS BY MOUTH TWICE DAILY  Refills: 0      isosorbide mononitrate ER (IMDUR) 30 mg tablet Take 30 mg by mouth daily. lisinopril (PRINIVIL, ZESTRIL) 5 mg tablet Take 5 mg by mouth daily. carvedilol (COREG) 12.5 mg tablet Take 1 Tab by mouth two (2) times daily (with meals). Qty: 180 Tab, Refills: 3    Comments: Please consider 90 day supplies to promote better adherence  Associated Diagnoses: Cardiomyopathy, unspecified type ([address] ICD (implantable cardioverter-defibrillator) in place      famotidine (PEPCID) 20 mg tablet Take 20 mg by mouth two (2) times daily as needed. melatonin 5 mg cap capsule Take 5 mg by mouth nightly. omeprazole (PRILOSEC) 20 mg capsule Take 20 mg by mouth daily. Associated Diagnoses: Cardiomyopathy, unspecified type ([address] ICD (implantable cardioverter-defibrillator) in place      furosemide (LASIX) 40 mg tablet Take  by mouth daily. Associated Diagnoses: Cardiomyopathy, unspecified type ([address] ICD (implantable cardioverter-defibrillator) in place      montelukast (SINGULAIR) 10 mg tablet Take 10 mg by mouth daily. Associated Diagnoses: Cardiomyopathy, unspecified type ([address] ICD (implantable cardioverter-defibrillator) in place      atorvastatin (LIPITOR) 40 mg tablet Take  by mouth daily.     Associated Diagnoses: Type 2 diabetes mellitus with hyperglycemia, with long-term current use of insulin ([facility] Mixed hyperlipidemia; Essential hypertension; Non morbid obesity due to excess calories      sertraline (ZOLOFT) 100 mg tablet Take 200 mg by mouth daily. Associated Diagnoses: Cardiomyopathy ([facility] Essential hypertension; Non morbid obesity, unspecified obesity type; Encounter for long-term (current) use of insulin ([facility] Type 2 diabetes mellitus with diabetic chronic kidney disease, unspecified CKD stage, unspecified long term insulin use status; BMI 35.0-35.9,adult      aspirin delayed-release 81 mg tablet Take 81 mg by mouth daily. Associated Diagnoses: Cardiomyopathy ([facility] Essential hypertension; Non morbid obesity, unspecified obesity type; Encounter for long-term (current) use of insulin ([facility] Type 2 diabetes mellitus with diabetic chronic kidney disease, unspecified CKD stage, unspecified long term insulin use status; BMI 35.0-35.9,adult      albuterol (PROAIR HFA) 90 mcg/actuation inhaler Take  by inhalation. amLODIPine (NORVASC) 5 mg tablet Take 5 mg by mouth nightly. meclizine (ANTIVERT) 25 mg tablet Take 1 tablet by mouth three (3) times daily as needed. Qty: 90 tablet, Refills: 1    Associated Diagnoses: Type II or unspecified type diabetes mellitus with renal manifestations, uncontrolled(250.42) ([facility] ARF (acute renal failure) ([facility] HTN (hypertension); Hypercalcemia      buPROPion XL (WELLBUTRIN XL) 300 mg XL tablet Take 300 mg by mouth every morning. Associated Diagnoses: Type II or unspecified type diabetes mellitus without mention of complication, uncontrolled; HTN (hypertension)      calcium-cholecalciferol, D3, (CALCARB 600 WITH VITAMIN D) tablet Take 1 Tab by mouth daily. loratadine 10 mg cap Take 10 mg by mouth daily. vitamin E/quinine sulfate (LEG CRAMP TREATMENT PO) Take  by mouth.       insulin lispro (HUMALOG KWIKPEN INSULIN) 200 unit/mL (3 mL) inpn Use as directed dispense as sample per Dr Valladares Colquitt: 1 Pen, Refills: 0    Associated Diagnoses: Type 2 diabetes mellitus with diabetic neuropathy, with long-term current use of insulin ([facility] Essential hypertension; Mixed hyperlipidemia      insulin glargine (LANTUS SOLOSTAR U-100 INSULIN) 100 unit/mL (3 mL) inpn Inject 60 units between 7 - 8 PM . Stop Basaglar  Qty: 60 mL, Refills: 3    Comments: Please inform pt Basaglar not covered  Associated Diagnoses: Type 2 diabetes mellitus with hyperglycemia, with long-term current use of insulin (HCC)      insulin aspart U-100 (NOVOLOG FLEXPEN U-100 INSULIN) 100 unit/mL inpn INJECT 10 -  15 units before breakfast , lunch and dinner  (STOP NOVOLIN R AND APIDRA)  Qty: 45 mL, Refills: 3    Associated Diagnoses: Type 2 diabetes mellitus with hyperglycemia, with long-term current use of insulin (HCC)      Insulin Needles, Disposable, 32 gauge x 5/32\" ndle USE TWICE DAILY  Qty: 100 Pen Needle, Refills: 11    Comments: Please consider 90 day supplies to promote better adherence  Associated Diagnoses: Type 2 diabetes mellitus with hyperglycemia, with long-term current use of insulin (HCC)      promethazine (PHENERGAN) 6.25 mg/5 mL syrup     Associated Diagnoses: Type 2 diabetes mellitus with diabetic chronic kidney disease, unspecified CKD stage, unspecified whether long term insulin use ([facility] Mixed hyperlipidemia; Essential hypertension      PROLIA 60 mg/mL injection     Associated Diagnoses: Type 2 diabetes mellitus with hyperglycemia, with long-term current use of insulin ([facility] Mixed hyperlipidemia; Essential hypertension; Non morbid obesity due to excess calories      !! TRUEPLUS LANCETS 33 gauge misc       FERROUS FUMARATE (IRON PO) Take 27 mg by mouth daily. !! Lancets misc Test blood glucose 4 times daily  Qty: 200 Each, Refills: 11    Associated Diagnoses: Type II or unspecified type diabetes mellitus with renal manifestations, uncontrolled(250.42) ([address]  Acute renal failure, unspecified acute renal failure type ([address] Essential hypertension; Hypercalcemia      nitroglycerin (NITROSTAT) 0.4 mg SL tablet by SubLINGual route every five (5) minutes as needed for Chest Pain. !! - Potential duplicate medications found. Please discuss with provider. STOP taking these medications       amiodarone (CORDARONE) 200 mg tablet Comments:   Reason for Stopping:               Referenced discharge instructions provided by nursing for diet and activity.             Follow-up with Dr Felecia Olvera in 30 days    Signed:  Lele Cotto NP  8/13/2019  9:50 AM

## 2019-08-13 NOTE — PROGRESS NOTES
In preparation for discharge after 12 noon today, I have completed AVS med-updates, Care Plans and Education in [facility] and have set message for CMS to assist with PCP appointment. Will continue to follow discharge process.

## 2019-08-15 ENCOUNTER — TELEPHONE (OUTPATIENT)
Dept: CARDIOLOGY CLINIC | Age: 64
End: 2019-08-15

## 2019-08-15 PROBLEM — I42.8 NON-ISCHEMIC CARDIOMYOPATHY (HCC): Status: ACTIVE | Noted: 2019-08-15

## 2019-08-15 NOTE — TELEPHONE ENCOUNTER
Called patient, ID verified using two patient identifiers. Scheduled patient for upgrade of single chamber [place] ICD to BiV ICD on Friday 9/6/19 @ [facility]  with arrival time of 9 am. Patient aware that she will stay overnight after procedure, and verbalizes understanding of all information.

## 2019-08-22 ENCOUNTER — OFFICE VISIT (OUTPATIENT)
Dept: ENDOCRINOLOGY | Age: 64
End: 2019-08-22

## 2019-08-22 VITALS
DIASTOLIC BLOOD PRESSURE: 61 MMHG | BODY MASS INDEX: 36.54 KG/M2 | HEIGHT: 64 IN | SYSTOLIC BLOOD PRESSURE: 107 MMHG | HEART RATE: 80 BPM | WEIGHT: 214 LBS

## 2019-08-22 DIAGNOSIS — E78.2 MIXED HYPERLIPIDEMIA: ICD-10-CM

## 2019-08-22 DIAGNOSIS — Z79.4 TYPE 2 DIABETES MELLITUS WITH HYPERGLYCEMIA, WITH LONG-TERM CURRENT USE OF INSULIN (HCC): ICD-10-CM

## 2019-08-22 DIAGNOSIS — Z79.4 TYPE 2 DIABETES MELLITUS WITH CHRONIC KIDNEY DISEASE, WITH LONG-TERM CURRENT USE OF INSULIN, UNSPECIFIED CKD STAGE (HCC): Primary | ICD-10-CM

## 2019-08-22 DIAGNOSIS — E11.65 TYPE 2 DIABETES MELLITUS WITH HYPERGLYCEMIA, WITH LONG-TERM CURRENT USE OF INSULIN (HCC): ICD-10-CM

## 2019-08-22 DIAGNOSIS — E11.22 TYPE 2 DIABETES MELLITUS WITH CHRONIC KIDNEY DISEASE, WITH LONG-TERM CURRENT USE OF INSULIN, UNSPECIFIED CKD STAGE (HCC): Primary | ICD-10-CM

## 2019-08-22 DIAGNOSIS — I10 ESSENTIAL HYPERTENSION: ICD-10-CM

## 2019-08-22 RX ORDER — INSULIN ASPART 100 [IU]/ML
INJECTION, SOLUTION INTRAVENOUS; SUBCUTANEOUS
Qty: 45 ML | Refills: 3 | Status: SHIPPED | OUTPATIENT
Start: 2019-08-22

## 2019-08-22 RX ORDER — INSULIN GLARGINE 100 [IU]/ML
INJECTION, SOLUTION SUBCUTANEOUS
Qty: 60 ML | Refills: 3 | Status: SHIPPED | OUTPATIENT
Start: 2019-08-22

## 2019-08-22 NOTE — LETTER
8/24/19 Patient: Dayana Vega YOB: 1955 Date of Visit: 8/22/2019 Emery Daniel NP 
[facility] [address],[place] [id number] VIA Facsimile: [phone] Dear Emery Daniel NP, Thank you for referring Ms. Radha Hidalgo to [facility] for evaluation. My notes for this consultation are attached. If you have questions, please do not hesitate to call me. I look forward to following your patient along with you. Sincerely, Sakina Olmstead MD

## 2019-08-22 NOTE — PATIENT INSTRUCTIONS
Check blood sugars before meals   Avoid juices and sodas ( even diet )   Reduce starchy food       Insulin should be handles by family member or nurse , not patient      Januvia 50 mg in AM     No insulin if sugars are less than 100     Basaglar or LAntus or  Novolin N cloudy insulin ( slow acting  insulin ) 70 units between 7 - 8 PM     Novolog or Humalog ( fast acting )  before meals       100- 150 - 20 units      151 - 200 - 25 units       201 - 250 - 30 units       251 - 300 - 35 units       301 - 350  - 40 units       351- 400  - 45 units      >401        - 50 units         Novolog or Humalog for high sugars if not eating        150 - 200 - 5 units      201 - 250 - 10 units      251 - 300 - 15 units      301 - 350 - 20 units      351- 400  - 25 units     >401 or High -  30 units

## 2019-08-24 NOTE — PROGRESS NOTES
Yo Tran MD    Patient Information  Date:8/24/2019  Name : Ilana Amezquita 59 y.o.     YOB: 1955         Referred by: self referred        Chief Complaint   Patient presents with    Diabetes     PCP and Pharmacy verified       History of Present Illness: Ilana Amezquita is a 59 y.o. female here for follow-up of  Type 2 Diabetes Mellitus. She is here with an aide who is not her usual aide,  with the patient  I have discussed with both patient and the  that Mrs. Diya Perrin should not handle the insulin as she gets confused  They were asked to bring the log with insulin given documented, no documentation in the month of August  There are very few episodes of hypoglycemia In 40s  I do not know the insulin dose given  Patient often gets confused,  works at nights and depending on the aide for most of the help      Prior history   she was getting confused with insulin, taking insulin inappropriately. Diet is unhealthy      She has  history of several myocardial infarctions, congestive heart failure. Her cardiologist is Dr. Rudy iZmmer. She has some memory issues, evaluated by psychologist as well as neurology.           Wt Readings from Last 3 Encounters:   08/22/19 214 lb (97.1 kg)   08/12/19 209 lb (94.8 kg)   07/05/19 206 lb (93.4 kg)       BP Readings from Last 3 Encounters:   08/22/19 107/61   08/13/19 113/65   07/05/19 104/52           Past Medical History:   Diagnosis Date    Anxiety     Asthma     Asthma     Carpal tunnel syndrome on both sides     Chronic mental illness     Coronary artery disease     Depression     Fracture     right ankle    GERD (gastroesophageal reflux disease)     Hypertension     Memory disorder     PUD (peptic ulcer disease)     Type II or unspecified type diabetes mellitus without mention of complication, uncontrolled     Vertigo      Current Outpatient Medications   Medication Sig    insulin glargine (LANTUS SOLOSTAR U-100 INSULIN) 100 unit/mL (3 mL) inpn Inject 70 units between 7 - 8 PM . Stop Basaglar    insulin aspart U-100 (NOVOLOG FLEXPEN U-100 INSULIN) 100 unit/mL (3 mL) inpn INJECT 10 -  20  units before breakfast , lunch and dinner depending on sugar (STOP NOVOLIN R AND APIDRA)    SITagliptin (JANUVIA) 50 mg tablet TAKE ONE TABLET BY MOUTH IN THE MORNING    apixaban (ELIQUIS) 5 mg tablet Take 1 Tab by mouth two (2) times a day.  SYMBICORT 160-4.5 mcg/actuation HFAA INHALE 2 PUFFS BY MOUTH TWICE DAILY    isosorbide mononitrate ER (IMDUR) 30 mg tablet Take 30 mg by mouth daily.  lisinopril (PRINIVIL, ZESTRIL) 5 mg tablet Take 5 mg by mouth daily.  vitamin E/quinine sulfate (LEG CRAMP TREATMENT PO) Take  by mouth.  carvedilol (COREG) 12.5 mg tablet Take 1 Tab by mouth two (2) times daily (with meals).  Insulin Needles, Disposable, 32 gauge x 5/32\" ndle USE TWICE DAILY    famotidine (PEPCID) 20 mg tablet Take 20 mg by mouth two (2) times daily as needed.  melatonin 5 mg cap capsule Take 5 mg by mouth nightly.  omeprazole (PRILOSEC) 20 mg capsule Take 20 mg by mouth daily.  furosemide (LASIX) 40 mg tablet Take  by mouth daily.  montelukast (SINGULAIR) 10 mg tablet Take 10 mg by mouth daily.  promethazine (PHENERGAN) 6.25 mg/5 mL syrup     PROLIA 60 mg/mL injection     atorvastatin (LIPITOR) 40 mg tablet Take  by mouth daily.  sertraline (ZOLOFT) 100 mg tablet Take 200 mg by mouth daily.  aspirin delayed-release 81 mg tablet Take 81 mg by mouth daily.  TRUEPLUS LANCETS 33 gauge misc     FERROUS FUMARATE (IRON PO) Take 27 mg by mouth daily.  albuterol (PROAIR HFA) 90 mcg/actuation inhaler Take  by inhalation.  amLODIPine (NORVASC) 5 mg tablet Take 5 mg by mouth nightly.  Lancets misc Test blood glucose 4 times daily    meclizine (ANTIVERT) 25 mg tablet Take 1 tablet by mouth three (3) times daily as needed.     buPROPion XL [facility] XL) 300 mg XL tablet Take 300 mg by mouth every morning.  calcium-cholecalciferol, D3, (CALCARB 600 WITH VITAMIN D) tablet Take 1 Tab by mouth daily.  loratadine 10 mg cap Take 10 mg by mouth daily.  nitroglycerin (NITROSTAT) 0.4 mg SL tablet by SubLINGual route every five (5) minutes as needed for Chest Pain. No current facility-administered medications for this visit. Allergies   Allergen Reactions    Beef Derived (Bovine) Hives    Shellfish Derived Hives         Review of Systems:  -   - Eyes: no blurry vision no double vision  - Cardiovascular: no chest pain ,no palpitations  - Respiratory: no cough no shortness of breath  - Gastrointestinal: no dysphagia no  abdominal pain  -   -     Physical Examination:   Blood pressure 107/61, pulse 80, height 5' 4\" (1.626 m), weight 214 lb (97.1 kg). Estimated body mass index is 36.73 kg/m² as calculated from the following:    Height as of this encounter: 5' 4\" (1.626 m). -   Weight as of this encounter: 214 lb (97.1 kg).   - General: pleasant, no distress, good eye contact  - HEENT: no pallor, no periorbital edema, EOMI  - Neck: supple,   - CVs: S1-S2 heard  - RS: Clear  -   - Psychiatric: normal mood and affect  - Skin: color, texture, turgor normal.     Diabetic foot exam: April 2019    Left:     Vibratory sensation absent   Filament test decreased sensation with micro filament   Pulse DP: 1+    Deformities: Callus  Right:    Vibratory sensation absent   Filament test decreased sensation with micro filament   Pulse DP: 1+   Deformities: None    Data Reviewed:     Lab Results   Component Value Date/Time    Hemoglobin A1c 8.4 (H) 08/08/2019 10:47 AM    Hemoglobin A1c 8.2 (H) 04/10/2019 12:19 PM    Hemoglobin A1c 10.7 (H) 01/03/2018 09:49 AM    Hemoglobin A1c, External 9.8 08/09/2017    Hemoglobin A1c, External 7.7 03/23/2017    Hemoglobin A1c, External 7.7 03/13/2017    Glucose 127 (H) 08/12/2019 11:03 AM    Glucose (POC) 282 (H) 08/13/2019 11:34 AM Microalb/Creat ratio (ug/mg creat.) 26.9 04/10/2019 12:19 PM    LDL,Direct 102 (H) 04/28/2015 04:53 PM    LDL, calculated 59 01/03/2018 09:49 AM    Creatinine 1.63 (H) 08/12/2019 11:03 AM      Lab Results   Component Value Date/Time    GFR est non-AA 32 (L) 08/12/2019 11:03 AM    GFR est AA 38 (L) 08/12/2019 11:03 AM    Creatinine 1.63 (H) 08/12/2019 11:03 AM    BUN 42 (H) 08/12/2019 11:03 AM    Sodium 142 08/12/2019 11:03 AM    Potassium 4.4 08/12/2019 11:03 AM    Chloride 112 (H) 08/12/2019 11:03 AM    CO2 24 08/12/2019 11:03 AM    Phosphorus 2.9 12/17/2014 02:53 PM    PTH, Intact 27 12/17/2014 02:53 PM             Assessment/Plan:     1. Type 2 diabetes mellitus with chronic kidney disease, with long-term current use of insulin, unspecified CKD stage ([facility] [address]    2. Essential hypertension    3. Mixed hyperlipidemia    4. Type 2 diabetes mellitus with hyperglycemia, with long-term current use of insulin ([facility])        1. 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 Lo Ruiz should avoid handling insulin completely,   Lantus  NovoLog    Diet not healthy: Stressed the importance of decreasing the carbohydrate portion, worsening insulin resistance  Januvia 50 mg  High risk for decompensation    Discussed the importance of checking home glucose regularly and taking all of their scheduled medications in order to have the best possible outcome. Reviewed the complications and importance of diet. Maintain the blood glucose log , with insulin doses - d/w aid again     Advised to check glucose 4 times daily  FLU annually ,Pneumovax ,aspirin daily,annual eye exam,microalbumin    2. HTN : Continue current therapy     3.  Hyperlipidemia : Continue statin. 4. CKD  -    5 . Obesity Body mass index is 36.73 kg/m². 7 CAD/CHF - Dr Caryle Kitten, EF 20%        Patient Instructions   Check blood sugars before meals   Avoid juices and sodas ( even diet )   Reduce starchy food       Insulin should be handles by family member or nurse , not patient      Januvia 50 mg in AM     No insulin if sugars are less than 100     Basaglar or LAntus or  Novolin N cloudy insulin ( slow acting  insulin ) 70 units between 7 - 8 PM     Novolog or Humalog ( fast acting )  before meals       100- 150 - 20 units      151 - 200 - 25 units       201 - 250 - 30 units       251 - 300 - 35 units       301 - 350  - 40 units       351- 400  - 45 units      >401        - 50 units         Novolog or Humalog for high sugars if not eating        150 - 200 - 5 units      201 - 250 - 10 units      251 - 300 - 15 units      301 - 350 - 20 units      351- 400  - 25 units     >401 or High -  30 units                                                               Patient verbalized understanding  Follow-up and Dispositions    · Return in about 4 months (around 12/22/2019) for labs before next visit and follow up. Thank you for allowing me to participate in the care of this patient.     John Kenny MD

## 2019-08-27 NOTE — TELEPHONE ENCOUNTER
Order for 1mg Ativan IV to be given once requested and received from hospitalist. Ativan given and pt placed on 2L O2 via nasal cannula per MD request. Requested Prescriptions     Signed Prescriptions Disp Refills    carvedilol (COREG) 12.5 mg tablet 180 Tab 3     Sig: Take 1 Tab by mouth two (2) times daily (with meals). Authorizing Provider: Zoya Mahmood     Ordering User: Primo Alfaro     Refill per verbal order Dr. Derek Jaime.

## 2019-08-28 ENCOUNTER — TELEPHONE (OUTPATIENT)
Dept: ENDOCRINOLOGY | Age: 64
End: 2019-08-28

## 2019-08-28 NOTE — TELEPHONE ENCOUNTER
Caregiver Naif Patient called and wanted to clarify testing times. Told Caregiver that Ms Yo May is to chek BG before each meal per last note. Explained pt is to use SSI before meals and still take Lantus between 7-8 PM. Caregiver verbalized understanding.

## 2019-08-28 NOTE — TELEPHONE ENCOUNTER
----- Message from Alexander Dunn sent at 8/28/2019  1:24 PM EDT -----  Regarding: Dr. Hugh Chris first and last name: Heather/ Caregiver      Reason for call:  How to fill out the \"Blood sugar record form\"      Callback required yes/no and why: yes      Best contact number(s): [phone]    Details to clarify the request:      Alexander Dunn

## 2019-08-29 RX ORDER — SODIUM CHLORIDE 0.9 % (FLUSH) 0.9 %
5-40 SYRINGE (ML) INJECTION EVERY 8 HOURS
Status: CANCELLED | OUTPATIENT
Start: 2019-08-29

## 2019-08-29 RX ORDER — SODIUM CHLORIDE 0.9 % (FLUSH) 0.9 %
5-40 SYRINGE (ML) INJECTION AS NEEDED
Status: CANCELLED | OUTPATIENT
Start: 2019-08-29

## 2019-08-30 ENCOUNTER — TELEPHONE (OUTPATIENT)
Dept: CARDIOLOGY CLINIC | Age: 64
End: 2019-08-30

## 2019-08-30 NOTE — TELEPHONE ENCOUNTER
Returned call no answer, message left  on Jonathan Robbins's confidential voice mail  that CPT codes are 90394 and 41768.

## 2019-09-01 ENCOUNTER — OP HISTORICAL/CONVERTED ENCOUNTER (OUTPATIENT)
Dept: OTHER | Age: 64
End: 2019-09-01

## 2019-09-06 ENCOUNTER — HOSPITAL ENCOUNTER (OUTPATIENT)
Age: 64
Setting detail: OUTPATIENT SURGERY
Discharge: HOME | End: 2019-09-06
Attending: INTERNAL MEDICINE | Admitting: INTERNAL MEDICINE
Payer: MEDICAID

## 2019-09-06 VITALS
DIASTOLIC BLOOD PRESSURE: 98 MMHG | HEIGHT: 64 IN | TEMPERATURE: 97.8 F | OXYGEN SATURATION: 96 % | RESPIRATION RATE: 12 BRPM | HEART RATE: 80 BPM | BODY MASS INDEX: 35.61 KG/M2 | WEIGHT: 208.56 LBS | SYSTOLIC BLOOD PRESSURE: 126 MMHG

## 2019-09-06 DIAGNOSIS — I42.9 CARDIOMYOPATHY, UNSPECIFIED TYPE (HCC): ICD-10-CM

## 2019-09-06 LAB
GLUCOSE BLD STRIP.AUTO-MCNC: 99 MG/DL (ref 65–100)
SERVICE CMNT-IMP: NORMAL

## 2019-09-06 PROCEDURE — 77030038269 HC DRN EXT URIN PURWCK BARD -A

## 2019-09-06 PROCEDURE — 82962 GLUCOSE BLOOD TEST: CPT

## 2019-09-06 RX ORDER — SODIUM CHLORIDE 0.9 % (FLUSH) 0.9 %
5-40 SYRINGE (ML) INJECTION AS NEEDED
Status: DISCONTINUED | OUTPATIENT
Start: 2019-09-06 | End: 2019-09-06 | Stop reason: HOSPADM

## 2019-09-06 RX ORDER — SODIUM CHLORIDE 0.9 % (FLUSH) 0.9 %
5-40 SYRINGE (ML) INJECTION EVERY 8 HOURS
Status: DISCONTINUED | OUTPATIENT
Start: 2019-09-06 | End: 2019-09-06 | Stop reason: HOSPADM

## 2019-09-06 NOTE — PROGRESS NOTES
9/6/2019  11:26 AM  Case management note    Was requested to see patient by cath lab. Patient got here for procedure and she was sick and unable to have procedure. She had to take a cab her. Set up cab with round trip. Patient is suppose to have aid 5 days a week, according to patient they do not always show up  She needs help with medications as her and her  and unable to distribute them correctly. She would benefit from home health as well. Will continue to work on services for this patient. Update, called [facility] for [facility], [facility]. Left message. Working in finding [address] that cover that area, Spoke to [employer] at [employer]. We are going to work together to find resources for this family.  [address] [facility] Vidal Harris

## 2019-09-06 NOTE — PROGRESS NOTES
Patient arrived. ID and allergies verified verbally with patient. Pt voices understanding of procedure to be performed. Consent obtained. Pt prepped for procedure. 10:45, @ bedside,procedure cancelled. Pt DC with . 11:00,ED  called to see pt,pt and  need some help @ home.

## 2019-09-06 NOTE — H&P
Patient reports recent pneumonia and may or may not still be on PO abx; recent episode of asthma and reports not back to baseline; recent concern for GI bleeding on eliquis for which she required transfusion of PRBCs last week. Reports her PCP felt her pneumonia 'has not cleared yet' and that she 'may still be bleeding'. Will postpone upgrade to CRTD system (A lead/LV lead) until she is cleared by her PCP; she is planned to revisit with her PCP in 3 weeks.        Jean Marie Webb MD

## 2019-09-24 ENCOUNTER — TELEPHONE (OUTPATIENT)
Dept: CARDIOLOGY CLINIC | Age: 64
End: 2019-09-24

## 2019-09-26 NOTE — TELEPHONE ENCOUNTER
Ana Ivey from Dr. Rosa Avalos office is calling to check on status of cardiac clearance for patient's upper endoscopy that is scheduled for this coming Tuesday.      Fax: [phone] Cj Wright LPN     Phone: [phone]

## 2019-09-27 NOTE — TELEPHONE ENCOUNTER
Taryn Proctor from Dr. Micheal Tee office is attempting to obtain clearance for patient's procedure on Tuesday. Please call back at [phone]. She states patient is supposed to stop Eliquis today.

## 2019-10-09 ENCOUNTER — TELEPHONE (OUTPATIENT)
Dept: CARDIOLOGY CLINIC | Age: 64
End: 2019-10-09

## 2019-10-09 NOTE — TELEPHONE ENCOUNTER
Patient following up, would like to speak with someone regarding a visit with another Dr.     Phone: [phone]

## 2019-10-09 NOTE — TELEPHONE ENCOUNTER
Patient requesting a call back to discuss one of her visits with another doctor. Please call back at [phone].

## 2019-10-09 NOTE — TELEPHONE ENCOUNTER
Patient states she received the approval from her pcp to proceed with device upgrade. Will plan for 11/4 at [facility]. Understanding expressed.

## 2019-10-21 DIAGNOSIS — I48.91 ATRIAL FIBRILLATION, UNSPECIFIED TYPE (HCC): ICD-10-CM

## 2019-10-21 DIAGNOSIS — I42.8 NON-ISCHEMIC CARDIOMYOPATHY (HCC): Primary | ICD-10-CM

## 2019-10-21 DIAGNOSIS — I10 ESSENTIAL HYPERTENSION: ICD-10-CM

## 2019-10-21 DIAGNOSIS — I42.9 CARDIOMYOPATHY, UNSPECIFIED TYPE (HCC): ICD-10-CM

## 2019-10-31 RX ORDER — SODIUM CHLORIDE 0.9 % (FLUSH) 0.9 %
5-40 SYRINGE (ML) INJECTION EVERY 8 HOURS
Status: CANCELLED | OUTPATIENT
Start: 2019-10-31

## 2019-10-31 RX ORDER — SODIUM CHLORIDE 0.9 % (FLUSH) 0.9 %
5-40 SYRINGE (ML) INJECTION AS NEEDED
Status: CANCELLED | OUTPATIENT
Start: 2019-10-31

## 2019-11-04 ENCOUNTER — IP HISTORICAL/CONVERTED ENCOUNTER (OUTPATIENT)
Dept: OTHER | Age: 64
End: 2019-11-04

## 2019-11-04 DIAGNOSIS — E11.65 TYPE 2 DIABETES MELLITUS WITH HYPERGLYCEMIA, WITH LONG-TERM CURRENT USE OF INSULIN (HCC): ICD-10-CM

## 2019-11-04 DIAGNOSIS — Z79.4 TYPE 2 DIABETES MELLITUS WITH HYPERGLYCEMIA, WITH LONG-TERM CURRENT USE OF INSULIN (HCC): ICD-10-CM

## 2019-11-04 RX ORDER — PEN NEEDLE, DIABETIC 31 GX3/16"
NEEDLE, DISPOSABLE MISCELLANEOUS
Qty: 200 PEN NEEDLE | Refills: 3 | Status: SHIPPED | OUTPATIENT
Start: 2019-11-04

## 2020-02-16 ENCOUNTER — IP HISTORICAL/CONVERTED ENCOUNTER (OUTPATIENT)
Dept: OTHER | Age: 65
End: 2020-02-16

## 2020-05-05 ENCOUNTER — TELEPHONE (OUTPATIENT)
Dept: CARDIOLOGY CLINIC | Age: 65
End: 2020-05-05

## 2020-05-05 NOTE — TELEPHONE ENCOUNTER
Called pt on both home & cell to r/s appt on 5/6 due to COVID 19.  L/m on both ph#.  Pts Latitude remote is showing disconnected. She needs to call [facility] Pt Support #[phone] for help.

## 2020-05-06 ENCOUNTER — TELEPHONE (OUTPATIENT)
Dept: CARDIOLOGY CLINIC | Age: 65
End: 2020-05-06

## 2020-05-06 NOTE — TELEPHONE ENCOUNTER
Spoke to patient spouse Dago Mcintosh and he stated patientt is in a rehab in [place] and is not doing well. Mr. Lisbeth Vega stated his wife kidneys has failed and is now on dialysis tues & thurs. Mr. Lisbeth Vega will take her latitude box to the rehab and will give the office a call back.

## 2020-07-14 ENCOUNTER — IP HISTORICAL/CONVERTED ENCOUNTER (OUTPATIENT)
Dept: OTHER | Age: 65
End: 2020-07-14

## 2020-11-02 NOTE — TELEPHONE ENCOUNTER
[facility] calling from [facility] to obtain procedure codes for the patients procedure on Friday 9/6.      Phone: [phone] [Time Spent: ___ minutes] : I have spent [unfilled] minutes of time on the encounter.

## 2021-03-16 NOTE — PROGRESS NOTES
HISTORY OF PRESENTING ILLNESS      Waldemar Whitmore is a 64 y.o. female with diabetes, hypertension, obesity, anxiety, asthma, CAD, GERD, depression, vertigo and cardiomyopathy referred for consideration of ICD for primary prevention of sudden cardiac death. Her recent LVEF by echo was 20% and she is currently wearing a Lifevest. Her EKG today shows normal sinus rhythm with narrow QRS duration. She has been on long-term guideline directed medical therapy.         ACTIVE PROBLEM LIST     Patient Active Problem List    Diagnosis Date Noted    Cardiomyopathy [facility]) 01/27/2017    Non morbid obesity 11/04/2016    Encounter for long-term (current) use of insulin ([facility] 06/14/2016    BMI 35.0-35.9,adult 01/04/2016    Essential hypertension 01/04/2016    Type 2 diabetes mellitus with diabetic chronic kidney disease ([facility] 01/04/2016    Obesity (BMI 30.0-34.9) 08/13/2015    Anxiety state, unspecified 07/02/2014    Invalid Neuropsych Profile With Very Strong Evidence Of Poor Test Taking Effort/Symptom Exaggeration/Malingering 04/24/2014    Memory loss 04/23/2014    Headache(784.0) 04/23/2014           PAST MEDICAL HISTORY     Past Medical History   Diagnosis Date    Anxiety     Asthma     Carpal tunnel syndrome on both sides     Chronic mental illness     Coronary artery disease     Depression     Fracture      right ankle    GERD (gastroesophageal reflux disease)     Hypertension     Memory disorder     PUD (peptic ulcer disease)     Type II or unspecified type diabetes mellitus without mention of complication, uncontrolled     Vertigo            PAST SURGICAL HISTORY     Past Surgical History   Procedure Laterality Date    Hx orthopaedic      Hx free skin graft      Hx coronary stent placement  2014    Hx ankle fracture tx            ALLERGIES     Allergies   Allergen Reactions    Beef Derived (Bovine) Hives    Shellfish Derived Hives          FAMILY HISTORY     Family History   Problem Relation Age of Onset    Cancer Mother     negative for cardiac disease       SOCIAL HISTORY     Social History     Social History    Marital status:      Spouse name: N/A    Number of children: N/A    Years of education: N/A     Social History Main Topics    Smoking status: Never Smoker    Smokeless tobacco: Never Used    Alcohol use No    Drug use: No    Sexual activity: Not Currently     Other Topics Concern    None     Social History Narrative         MEDICATIONS     Current Outpatient Prescriptions   Medication Sig    sertraline (ZOLOFT) 100 mg tablet Take 150 mg by mouth daily.  acetaminophen-codeine (TYLENOL-CODEINE #3) 300-30 mg per tablet Take 2 Tabs by mouth every six (6) hours as needed for Pain.  aspirin delayed-release 81 mg tablet Take  by mouth daily.  butalbital-acetaminophen-caffeine (FIORICET, ESGIC) -40 mg per tablet     carvedilol (COREG) 6.25 mg tablet     cephALEXin (KEFLEX) 500 mg capsule     gabapentin (NEURONTIN) 100 mg capsule 100 mg two (2) times a day.  TRUEPLUS LANCETS 33 gauge misc     Insulin Syringe-Needle U-100 0.5 mL 31 gauge x 5/16 syrg     FERROUS FUMARATE (IRON PO) Take 27 mg by mouth daily.  insulin NPH (NOVOLIN N) 100 unit/mL injection Inject 55 units in AM and 45 units at bedtime. STOP NOVOLIN 70/30    insulin regular (NOVOLIN R) 100 unit/mL injection Use with sliding scale Max units daily: 75    furosemide (LASIX) 20 mg tablet TAKE ONE TABLET BY MOUTH ONCE DAILY    traMADol (ULTRAM) 50 mg tablet Take 50 mg by mouth two (2) times a day.  butalbital-acetaminophen (PHRENILIN)  mg tablet Take 1 Tab by mouth every six (6) hours as needed.  PRENATAL VIT W-CA,FE,FA,<1 MG, (PRENATAL VITAMIN PO) Take 2 Tabs by mouth daily.  benzonatate (TESSALON) 200 mg capsule Take 200 mg by mouth three (3) times daily as needed for Cough.  sitaGLIPtin (JANUVIA) 50 mg tablet Take 1 Tab by mouth every morning.     guaiFENesin SR (MUCINEX) 600 mg SR tablet Take 1 Tab by mouth two (2) times a day. (Patient taking differently: Take 600 mg by mouth two (2) times daily as needed.)    fluocinoNIDE (LIDEX) 0.05 % topical cream Apply  to affected area two (2) times a day.  isosorbide mononitrate ER (IMDUR) 30 mg tablet Take  by mouth daily.  albuterol (PROAIR HFA) 90 mcg/actuation inhaler Take  by inhalation.  amLODIPine (NORVASC) 5 mg tablet Take 5 mg by mouth daily.  glucose blood VI test strips (TRUETEST TEST STRIPS) strip Test blood glucose 4 times daily    Lancets misc Test blood glucose 4 times daily    buPROPion XL (WELLBUTRIN XL) 300 mg XL tablet Take 300 mg by mouth every morning.  pravastatin (PRAVACHOL) 40 mg tablet Take 40 mg by mouth nightly.  famotidine (PEPCID) 20 mg tablet Take 20 mg by mouth two (2) times a day.  zolpidem (AMBIEN) 10 mg tablet Take  by mouth nightly as needed for Sleep.  calcium-cholecalciferol, D3, (CALCARB 600 WITH VITAMIN D) tablet Take 2 Tabs by mouth daily.  nitroglycerin (NITROSTAT) 0.4 mg SL tablet by SubLINGual route every five (5) minutes as needed for Chest Pain.  loratadine 10 mg cap Take  by mouth.  amoxicillin (AMOXIL) 875 mg tablet     atorvastatin (LIPITOR) 40 mg tablet     dicyclomine (BENTYL) 10 mg capsule     lisinopril (PRINIVIL, ZESTRIL) 5 mg tablet     meclizine (ANTIVERT) 25 mg tablet Take 1 tablet by mouth three (3) times daily as needed.  metoprolol (LOPRESSOR) 25 mg tablet Take  by mouth two (2) times a day.  omeprazole (PRILOSEC) 40 mg capsule Take 40 mg by mouth daily.  LORazepam (ATIVAN) 0.5 mg tablet Take  by mouth.  metoclopramide HCl (REGLAN) 5 mg tablet Take 5 mg by mouth Before breakfast, lunch, and dinner.  losartan (COZAAR) 25 mg tablet Take  by mouth daily.  topiramate (TOPAMAX) 25 mg tablet 1 po qhs     No current facility-administered medications for this visit.         I have reviewed the nurses notes, vitals, problem list, allergy list, medical history, family, social history and medications. REVIEW OF SYMPTOMS      General: Pt denies excessive weight gain or loss. Pt is able to conduct ADL's  HEENT: Denies blurred vision, headaches, hearing loss, epistaxis and difficulty swallowing. Respiratory: Denies cough, congestion, shortness of breath, BECKWITH, wheezing or stridor. Cardiovascular: Denies precordial pain, palpitations, edema or PND  Gastrointestinal: Denies poor appetite, indigestion, abdominal pain or blood in stool  Genitourinary: Denies hematuria, dysuria, increased urinary frequency  Musculoskeletal: Denies joint pain or swelling from muscles or joints  Neurologic: Denies tremor, paresthesias, headache, or sensory motor disturbance  Psychiatric: Denies confusion, insomnia, depression  Integumentray: Denies rash, itching or ulcers. Hematologic: Denies easy bruising, bleeding     PHYSICAL EXAMINATION      Vitals:    01/27/17 1047   BP: 100/62   Pulse: 88   Resp: 20   SpO2: 93%   Weight: 205 lb 6.4 oz (93.2 kg)   Height: 5' 4\" (1.626 m)     General: Well developed, in no acute distress. HEENT: No jaundice, oral mucosa moist, no oral ulcers  Neck: Supple, no stiffness, no lymphadenopathy, supple  Heart:  Normal S1/S2 negative S3 or S4. Regular, no murmur, gallop or rub, no jugular venous distention  Respiratory: Clear bilaterally x 4, no wheezing or rales  Abdomen:   Soft, non-tender, bowel sounds are active.   Extremities:  No edema, normal cap refill, no cyanosis. Musculoskeletal: No clubbing, no deformities  Neuro: A&Ox3, speech clear, gait stable, cooperative, no focal neurologic deficits  Skin: Skin color is normal. No rashes or lesions.  Non diaphoretic, moist.  Vascular: 2+ pulses symmetric in all extremities       DIAGNOSTIC DATA      EKG: Normal sinus rhythm        LABORATORY DATA    No results found for: WBC, HGBPOC, HGB, HGBP, HCTPOC, HCT, PHCT, RBCH, PLT, MCV, HGBEXT, HCTEXT, PLTEXT   Lab Results   Component Value Date/Time Sodium 141 03/30/2016 10:07 AM    Potassium 4.3 03/30/2016 10:07 AM    Chloride 104 03/30/2016 10:07 AM    CO2 22 03/30/2016 10:07 AM    Glucose 269 03/30/2016 10:07 AM    BUN 25 03/30/2016 10:07 AM    Creatinine 1.18 03/30/2016 10:07 AM    BUN/Creatinine ratio 21 03/30/2016 10:07 AM    GFR est AA 58 03/30/2016 10:07 AM    GFR est non-AA 50 03/30/2016 10:07 AM    Calcium 10.2 03/30/2016 10:07 AM    Bilirubin, total <0.2 03/30/2016 10:07 AM    ALT 11 03/30/2016 10:07 AM    AST 10 03/30/2016 10:07 AM    Alk. phosphatase 100 03/30/2016 10:07 AM    Protein, total 7.3 03/30/2016 10:07 AM    Albumin 4.2 03/30/2016 10:07 AM    A-G Ratio 1.4 03/30/2016 10:07 AM           ASSESSMENT      1. Cardiomyopathy   A. Non-ischemic   B. NYHA class 2  2. CAD   A. Native  3. Percutaneous coronary transluminal angioplasty        PLAN     She agrees to pursue ICD implantation at [facility]. Plan for single chamber ICD with [employer]. FOLLOW-UP     Follow up 1 week post procedure. Thank you,  MD Dr. Andres Cruz for involving me in the care of this extraordinarily pleasant female. Please do not hesitate to contact me for further questions/concerns. Written by tere Rahman, as dictated by Dr. Timo Shahid.        Timo Shahid MD  Cardiac Electrophysiology / Cardiology    [facility].  [address] [facility], [facility], [address] [place] [address], [address] [place] [address], [facility]  [phone] / [phone] Fax   [phone] / [phone] Fax self-care/home management

## 2022-03-19 PROBLEM — I42.9 CARDIOMYOPATHY (HCC): Status: ACTIVE | Noted: 2017-01-27

## 2022-03-19 PROBLEM — E11.40 TYPE 2 DIABETES MELLITUS WITH DIABETIC NEUROPATHY (HCC): Status: ACTIVE | Noted: 2018-04-17

## 2022-03-19 PROBLEM — Z98.890 H/O ATRIOVENTRICULAR NODAL ABLATION: Status: ACTIVE | Noted: 2019-08-12

## 2022-03-19 PROBLEM — Z95.810 ICD (IMPLANTABLE CARDIOVERTER-DEFIBRILLATOR) IN PLACE: Status: ACTIVE | Noted: 2018-08-06

## 2022-03-19 PROBLEM — E78.2 MIXED HYPERLIPIDEMIA: Status: ACTIVE | Noted: 2017-02-04

## 2022-03-19 PROBLEM — I42.8 NON-ISCHEMIC CARDIOMYOPATHY (HCC): Status: ACTIVE | Noted: 2019-08-15

## 2022-03-20 PROBLEM — I48.91 A-FIB (HCC): Status: ACTIVE | Noted: 2019-05-07

## 2022-03-20 PROBLEM — E66.01 SEVERE OBESITY (BMI 35.0-39.9) WITH COMORBIDITY (HCC): Status: ACTIVE | Noted: 2018-04-17

## 2023-05-20 RX ORDER — ATORVASTATIN CALCIUM 40 MG/1
TABLET, FILM COATED ORAL DAILY
COMMUNITY

## 2023-05-20 RX ORDER — ALBUTEROL SULFATE 90 UG/1
AEROSOL, METERED RESPIRATORY (INHALATION)
COMMUNITY

## 2023-05-20 RX ORDER — BUPROPION HYDROCHLORIDE 300 MG/1
300 TABLET ORAL EVERY MORNING
COMMUNITY

## 2023-05-20 RX ORDER — LORATADINE 10 MG/1
10 CAPSULE, LIQUID FILLED ORAL DAILY
COMMUNITY

## 2023-05-20 RX ORDER — OMEPRAZOLE 20 MG/1
20 CAPSULE, DELAYED RELEASE ORAL DAILY
COMMUNITY

## 2023-05-20 RX ORDER — FUROSEMIDE 40 MG/1
TABLET ORAL DAILY
COMMUNITY

## 2023-05-20 RX ORDER — ISOSORBIDE MONONITRATE 30 MG/1
30 TABLET, EXTENDED RELEASE ORAL DAILY
COMMUNITY

## 2023-05-20 RX ORDER — CALCIUM CARBONATE/VITAMIN D3 600 MG-10
1 TABLET ORAL DAILY
COMMUNITY

## 2023-05-20 RX ORDER — AMLODIPINE BESYLATE 5 MG/1
5 TABLET ORAL NIGHTLY
COMMUNITY

## 2023-05-20 RX ORDER — MECLIZINE HYDROCHLORIDE 25 MG/1
25 TABLET ORAL 3 TIMES DAILY PRN
COMMUNITY
Start: 2014-12-18

## 2023-05-20 RX ORDER — LANCETS 30 GAUGE
EACH MISCELLANEOUS
COMMUNITY
Start: 2015-08-13

## 2023-05-20 RX ORDER — DENOSUMAB 60 MG/ML
INJECTION SUBCUTANEOUS
COMMUNITY
Start: 2017-07-25

## 2023-05-20 RX ORDER — ASPIRIN 81 MG/1
81 TABLET ORAL DAILY
COMMUNITY

## 2023-05-20 RX ORDER — BUDESONIDE AND FORMOTEROL FUMARATE DIHYDRATE 160; 4.5 UG/1; UG/1
2 AEROSOL RESPIRATORY (INHALATION) 2 TIMES DAILY
COMMUNITY
Start: 2019-06-29

## 2023-05-20 RX ORDER — INSULIN GLARGINE 100 [IU]/ML
INJECTION, SOLUTION SUBCUTANEOUS
COMMUNITY
Start: 2019-08-22

## 2023-05-20 RX ORDER — LISINOPRIL 5 MG/1
5 TABLET ORAL DAILY
COMMUNITY

## 2023-05-20 RX ORDER — CARVEDILOL 12.5 MG/1
12.5 TABLET ORAL 2 TIMES DAILY WITH MEALS
COMMUNITY
Start: 2019-03-05

## 2023-05-20 RX ORDER — SERTRALINE HYDROCHLORIDE 100 MG/1
200 TABLET, FILM COATED ORAL DAILY
COMMUNITY

## 2023-05-20 RX ORDER — MONTELUKAST SODIUM 10 MG/1
10 TABLET ORAL DAILY
COMMUNITY

## 2023-05-20 RX ORDER — PROMETHAZINE HYDROCHLORIDE 6.25 MG/5ML
SYRUP ORAL
COMMUNITY
Start: 2018-03-14

## 2023-05-20 RX ORDER — NITROGLYCERIN 0.4 MG/1
TABLET SUBLINGUAL
COMMUNITY

## 2023-05-20 RX ORDER — FAMOTIDINE 20 MG/1
20 TABLET, FILM COATED ORAL 2 TIMES DAILY PRN
COMMUNITY

## (undated) DEVICE — HEMO INTRO 8.5F 60CM SR0 --

## (undated) DEVICE — INTRODUCER SHTH 5 FRX30 CM 7 CM W/ NIT WIRE REG MICRO-STICK

## (undated) DEVICE — DRESSING HEMOSTATIC SFT INTVENT W/O SLT DBL WRP QUIKCLOT LF

## (undated) DEVICE — REM POLYHESIVE ADULT PATIENT RETURN ELECTRODE: Brand: VALLEYLAB

## (undated) DEVICE — CATH NAVISTAR BIDIR FJ 8MM --

## (undated) DEVICE — MEDI-TRACE CADENCE ADULT, DEFIBRILLATION ELECTRODE -RTS  (10 PR/PK) - PHYSIO-CONTROL: Brand: MEDI-TRACE CADENCE

## (undated) DEVICE — CABLE CATH L10FT RED PIN CONN 25-34 FOR NAVISTAR CARTO 3

## (undated) DEVICE — PATCH CARTO 3 EXT REF --

## (undated) DEVICE — PACK PROCEDURE SURG HRT CATH

## (undated) DEVICE — Device